# Patient Record
Sex: MALE | Race: WHITE | Employment: FULL TIME | URBAN - NONMETROPOLITAN AREA
[De-identification: names, ages, dates, MRNs, and addresses within clinical notes are randomized per-mention and may not be internally consistent; named-entity substitution may affect disease eponyms.]

---

## 2017-08-24 ENCOUNTER — OFFICE VISIT (OUTPATIENT)
Dept: URGENT CARE | Age: 47
End: 2017-08-24
Payer: COMMERCIAL

## 2017-08-24 VITALS
SYSTOLIC BLOOD PRESSURE: 138 MMHG | WEIGHT: 214 LBS | HEART RATE: 87 BPM | OXYGEN SATURATION: 97 % | BODY MASS INDEX: 32.43 KG/M2 | TEMPERATURE: 98.4 F | HEIGHT: 68 IN | RESPIRATION RATE: 18 BRPM | DIASTOLIC BLOOD PRESSURE: 82 MMHG

## 2017-08-24 DIAGNOSIS — R10.9 CENTRAL ABDOMINAL PAIN: Primary | ICD-10-CM

## 2017-08-24 DIAGNOSIS — R10.9 LEFT FLANK PAIN: ICD-10-CM

## 2017-08-24 DIAGNOSIS — Z87.442 HISTORY OF RENAL CALCULI: ICD-10-CM

## 2017-08-24 DIAGNOSIS — R30.0 DYSURIA: ICD-10-CM

## 2017-08-24 LAB
BILIRUBIN, POC: ABNORMAL
BLOOD URINE, POC: ABNORMAL
CLARITY, POC: CLEAR
COLOR, POC: YELLOW
GLUCOSE URINE, POC: ABNORMAL
KETONES, POC: ABNORMAL
LEUKOCYTE EST, POC: ABNORMAL
NITRITE, POC: ABNORMAL
PH, POC: 6
PROTEIN, POC: ABNORMAL
SPECIFIC GRAVITY, POC: 1.02
UROBILINOGEN, POC: 1

## 2017-08-24 PROCEDURE — 81002 URINALYSIS NONAUTO W/O SCOPE: CPT | Performed by: NURSE PRACTITIONER

## 2017-08-24 PROCEDURE — 99212 OFFICE O/P EST SF 10 MIN: CPT | Performed by: NURSE PRACTITIONER

## 2017-08-24 RX ORDER — OMEPRAZOLE 20 MG/1
20 CAPSULE, DELAYED RELEASE ORAL DAILY
COMMUNITY
Start: 2017-08-17 | End: 2017-09-13 | Stop reason: SDUPTHER

## 2017-08-24 RX ORDER — SIMVASTATIN 40 MG
40 TABLET ORAL NIGHTLY
COMMUNITY
Start: 2017-08-14 | End: 2017-09-13 | Stop reason: SDUPTHER

## 2017-08-24 RX ORDER — RAMIPRIL 10 MG/1
20 CAPSULE ORAL DAILY
COMMUNITY
Start: 2017-08-14 | End: 2017-09-13 | Stop reason: SDUPTHER

## 2017-08-24 RX ORDER — HYDROCHLOROTHIAZIDE 25 MG/1
25 TABLET ORAL DAILY
COMMUNITY
Start: 2017-08-14 | End: 2017-12-14 | Stop reason: SDUPTHER

## 2017-08-24 RX ORDER — BUPROPION HYDROCHLORIDE 150 MG/1
150 TABLET ORAL EVERY MORNING
COMMUNITY
Start: 2017-08-14 | End: 2017-09-13 | Stop reason: SDUPTHER

## 2017-08-24 RX ORDER — IBUPROFEN AND FAMOTIDINE 800; 26.6 MG/1; MG/1
TABLET, COATED ORAL
COMMUNITY
Start: 2017-08-11 | End: 2017-09-12 | Stop reason: ALTCHOICE

## 2017-08-24 ASSESSMENT — ENCOUNTER SYMPTOMS
CONSTIPATION: 0
ABDOMINAL PAIN: 1
VOMITING: 0
NAUSEA: 0

## 2017-08-25 ENCOUNTER — TELEPHONE (OUTPATIENT)
Dept: URGENT CARE | Age: 47
End: 2017-08-25

## 2017-08-25 DIAGNOSIS — K80.20 GALLSTONES: Primary | ICD-10-CM

## 2017-08-30 DIAGNOSIS — R10.9 LEFT FLANK PAIN: ICD-10-CM

## 2017-08-30 DIAGNOSIS — Z87.442 HISTORY OF RENAL CALCULI: ICD-10-CM

## 2017-08-30 DIAGNOSIS — R10.9 CENTRAL ABDOMINAL PAIN: ICD-10-CM

## 2017-09-06 LAB
ALBUMIN SERPL-MCNC: 4.6 G/DL (ref 3.5–5.2)
ALP BLD-CCNC: 50 U/L (ref 40–130)
ALT SERPL-CCNC: 28 U/L (ref 5–41)
ANION GAP SERPL CALCULATED.3IONS-SCNC: 16 MMOL/L (ref 7–19)
AST SERPL-CCNC: 23 U/L (ref 5–40)
BASOPHILS ABSOLUTE: 0 K/UL (ref 0–0.2)
BASOPHILS RELATIVE PERCENT: 0.7 % (ref 0–1)
BILIRUB SERPL-MCNC: 0.6 MG/DL (ref 0.2–1.2)
BUN BLDV-MCNC: 16 MG/DL (ref 6–20)
CALCIUM SERPL-MCNC: 9.4 MG/DL (ref 8.6–10)
CHLORIDE BLD-SCNC: 98 MMOL/L (ref 98–111)
CHOLESTEROL, TOTAL: 202 MG/DL (ref 160–199)
CO2: 25 MMOL/L (ref 22–29)
CREAT SERPL-MCNC: 1.3 MG/DL (ref 0.5–1.2)
EOSINOPHILS ABSOLUTE: 0.2 K/UL (ref 0–0.6)
EOSINOPHILS RELATIVE PERCENT: 2.6 % (ref 0–5)
GFR NON-AFRICAN AMERICAN: 59
GLUCOSE BLD-MCNC: 91 MG/DL (ref 74–109)
HCT VFR BLD CALC: 47.4 % (ref 42–52)
HDLC SERPL-MCNC: 40 MG/DL (ref 55–121)
HEMOGLOBIN: 16.5 G/DL (ref 14–18)
LDL CHOLESTEROL CALCULATED: 122 MG/DL
LYMPHOCYTES ABSOLUTE: 2.4 K/UL (ref 1.1–4.5)
LYMPHOCYTES RELATIVE PERCENT: 41.8 % (ref 20–40)
MCH RBC QN AUTO: 32.7 PG (ref 27–31)
MCHC RBC AUTO-ENTMCNC: 34.8 G/DL (ref 33–37)
MCV RBC AUTO: 93.9 FL (ref 80–94)
MONOCYTES ABSOLUTE: 0.6 K/UL (ref 0–0.9)
MONOCYTES RELATIVE PERCENT: 9.8 % (ref 0–10)
NEUTROPHILS ABSOLUTE: 2.6 K/UL (ref 1.5–7.5)
NEUTROPHILS RELATIVE PERCENT: 44.9 % (ref 50–65)
PDW BLD-RTO: 12.3 % (ref 11.5–14.5)
PLATELET # BLD: 255 K/UL (ref 130–400)
PMV BLD AUTO: 9.2 FL (ref 9.4–12.4)
POTASSIUM SERPL-SCNC: 4.4 MMOL/L (ref 3.5–5)
RBC # BLD: 5.05 M/UL (ref 4.7–6.1)
SODIUM BLD-SCNC: 139 MMOL/L (ref 136–145)
TOTAL PROTEIN: 7.4 G/DL (ref 6.6–8.7)
TRIGL SERPL-MCNC: 199 MG/DL (ref 150–199)
WBC # BLD: 5.7 K/UL (ref 4.8–10.8)

## 2017-09-13 ENCOUNTER — OFFICE VISIT (OUTPATIENT)
Dept: FAMILY MEDICINE CLINIC | Age: 47
End: 2017-09-13
Payer: COMMERCIAL

## 2017-09-13 VITALS
HEIGHT: 68 IN | DIASTOLIC BLOOD PRESSURE: 88 MMHG | WEIGHT: 214 LBS | SYSTOLIC BLOOD PRESSURE: 128 MMHG | RESPIRATION RATE: 18 BRPM | OXYGEN SATURATION: 96 % | BODY MASS INDEX: 32.43 KG/M2 | HEART RATE: 81 BPM | TEMPERATURE: 98.5 F

## 2017-09-13 DIAGNOSIS — F41.8 DEPRESSION WITH ANXIETY: ICD-10-CM

## 2017-09-13 DIAGNOSIS — I10 ESSENTIAL (PRIMARY) HYPERTENSION: ICD-10-CM

## 2017-09-13 DIAGNOSIS — N41.0 ACUTE PROSTATITIS: Primary | ICD-10-CM

## 2017-09-13 DIAGNOSIS — Z12.5 ENCOUNTER FOR PROSTATE CANCER SCREENING: ICD-10-CM

## 2017-09-13 DIAGNOSIS — K21.9 GASTROESOPHAGEAL REFLUX DISEASE WITHOUT ESOPHAGITIS: ICD-10-CM

## 2017-09-13 DIAGNOSIS — E29.1 HYPOGONADISM IN MALE: ICD-10-CM

## 2017-09-13 DIAGNOSIS — E78.01 FAMILIAL HYPERCHOLESTEROLEMIA: ICD-10-CM

## 2017-09-13 PROCEDURE — 99214 OFFICE O/P EST MOD 30 MIN: CPT | Performed by: FAMILY MEDICINE

## 2017-09-13 RX ORDER — SIMVASTATIN 40 MG
40 TABLET ORAL NIGHTLY
Qty: 30 TABLET | Refills: 5 | Status: SHIPPED | OUTPATIENT
Start: 2017-09-13 | End: 2017-12-14 | Stop reason: SDUPTHER

## 2017-09-13 RX ORDER — RAMIPRIL 10 MG/1
20 CAPSULE ORAL DAILY
Qty: 30 CAPSULE | Refills: 3 | Status: SHIPPED | OUTPATIENT
Start: 2017-09-13 | End: 2017-12-14 | Stop reason: SDUPTHER

## 2017-09-13 RX ORDER — BUPROPION HYDROCHLORIDE 150 MG/1
150 TABLET ORAL EVERY MORNING
Qty: 30 TABLET | Refills: 5 | Status: SHIPPED | OUTPATIENT
Start: 2017-09-13 | End: 2017-12-14 | Stop reason: SDUPTHER

## 2017-09-13 RX ORDER — OMEPRAZOLE 20 MG/1
20 CAPSULE, DELAYED RELEASE ORAL DAILY
Qty: 30 CAPSULE | Refills: 5 | Status: SHIPPED | OUTPATIENT
Start: 2017-09-13 | End: 2017-12-14 | Stop reason: SDUPTHER

## 2017-09-13 RX ORDER — CIPROFLOXACIN 500 MG/1
500 TABLET, FILM COATED ORAL 2 TIMES DAILY
Qty: 60 TABLET | Refills: 0 | Status: SHIPPED | OUTPATIENT
Start: 2017-09-13 | End: 2017-10-13

## 2017-09-14 ASSESSMENT — ENCOUNTER SYMPTOMS
NAUSEA: 0
ANAL BLEEDING: 0
DIARRHEA: 0
CONSTIPATION: 0
ABDOMINAL PAIN: 0
SHORTNESS OF BREATH: 0
CHEST TIGHTNESS: 0
COUGH: 0

## 2017-12-05 DIAGNOSIS — E29.1 HYPOGONADISM IN MALE: ICD-10-CM

## 2017-12-05 DIAGNOSIS — I10 ESSENTIAL (PRIMARY) HYPERTENSION: ICD-10-CM

## 2017-12-05 DIAGNOSIS — Z12.5 ENCOUNTER FOR PROSTATE CANCER SCREENING: ICD-10-CM

## 2017-12-05 LAB
ALBUMIN SERPL-MCNC: 4.8 G/DL (ref 3.5–5.2)
ALP BLD-CCNC: 55 U/L (ref 40–130)
ALT SERPL-CCNC: 24 U/L (ref 5–41)
ANION GAP SERPL CALCULATED.3IONS-SCNC: 13 MMOL/L (ref 7–19)
AST SERPL-CCNC: 17 U/L (ref 5–40)
BASOPHILS ABSOLUTE: 0.1 K/UL (ref 0–0.2)
BASOPHILS RELATIVE PERCENT: 0.8 % (ref 0–1)
BILIRUB SERPL-MCNC: 0.6 MG/DL (ref 0.2–1.2)
BUN BLDV-MCNC: 21 MG/DL (ref 6–20)
CALCIUM SERPL-MCNC: 9.4 MG/DL (ref 8.6–10)
CHLORIDE BLD-SCNC: 98 MMOL/L (ref 98–111)
CO2: 28 MMOL/L (ref 22–29)
CREAT SERPL-MCNC: 1.2 MG/DL (ref 0.5–1.2)
EOSINOPHILS ABSOLUTE: 0.2 K/UL (ref 0–0.6)
EOSINOPHILS RELATIVE PERCENT: 2.4 % (ref 0–5)
GFR NON-AFRICAN AMERICAN: >60
GLUCOSE BLD-MCNC: 94 MG/DL (ref 74–109)
HCT VFR BLD CALC: 50.3 % (ref 42–52)
HEMOGLOBIN: 16.9 G/DL (ref 14–18)
LYMPHOCYTES ABSOLUTE: 2.7 K/UL (ref 1.1–4.5)
LYMPHOCYTES RELATIVE PERCENT: 36.2 % (ref 20–40)
MCH RBC QN AUTO: 31.6 PG (ref 27–31)
MCHC RBC AUTO-ENTMCNC: 33.6 G/DL (ref 33–37)
MCV RBC AUTO: 94 FL (ref 80–94)
MONOCYTES ABSOLUTE: 0.7 K/UL (ref 0–0.9)
MONOCYTES RELATIVE PERCENT: 8.9 % (ref 0–10)
NEUTROPHILS ABSOLUTE: 3.9 K/UL (ref 1.5–7.5)
NEUTROPHILS RELATIVE PERCENT: 51 % (ref 50–65)
PDW BLD-RTO: 12.9 % (ref 11.5–14.5)
PLATELET # BLD: 279 K/UL (ref 130–400)
PMV BLD AUTO: 9.2 FL (ref 9.4–12.4)
POTASSIUM SERPL-SCNC: 4.5 MMOL/L (ref 3.5–5)
PROSTATE SPECIFIC ANTIGEN: 0.51 NG/ML (ref 0–4)
RBC # BLD: 5.35 M/UL (ref 4.7–6.1)
SODIUM BLD-SCNC: 139 MMOL/L (ref 136–145)
TESTOSTERONE TOTAL: 99.5 NG/DL (ref 249–836)
TOTAL PROTEIN: 7.2 G/DL (ref 6.6–8.7)
WBC # BLD: 7.6 K/UL (ref 4.8–10.8)

## 2017-12-14 ENCOUNTER — OFFICE VISIT (OUTPATIENT)
Dept: FAMILY MEDICINE CLINIC | Age: 47
End: 2017-12-14
Payer: COMMERCIAL

## 2017-12-14 VITALS
WEIGHT: 217 LBS | TEMPERATURE: 98.6 F | DIASTOLIC BLOOD PRESSURE: 90 MMHG | OXYGEN SATURATION: 98 % | BODY MASS INDEX: 32.14 KG/M2 | RESPIRATION RATE: 20 BRPM | SYSTOLIC BLOOD PRESSURE: 128 MMHG | HEIGHT: 69 IN | HEART RATE: 98 BPM

## 2017-12-14 DIAGNOSIS — I10 ESSENTIAL (PRIMARY) HYPERTENSION: ICD-10-CM

## 2017-12-14 DIAGNOSIS — F41.8 DEPRESSION WITH ANXIETY: ICD-10-CM

## 2017-12-14 DIAGNOSIS — E78.01 FAMILIAL HYPERCHOLESTEROLEMIA: ICD-10-CM

## 2017-12-14 DIAGNOSIS — K21.9 GASTROESOPHAGEAL REFLUX DISEASE WITHOUT ESOPHAGITIS: ICD-10-CM

## 2017-12-14 DIAGNOSIS — E29.1 HYPOGONADISM IN MALE: Primary | ICD-10-CM

## 2017-12-14 PROCEDURE — 99214 OFFICE O/P EST MOD 30 MIN: CPT | Performed by: FAMILY MEDICINE

## 2017-12-14 RX ORDER — OMEPRAZOLE 20 MG/1
20 CAPSULE, DELAYED RELEASE ORAL DAILY
Qty: 30 CAPSULE | Refills: 5 | Status: SHIPPED | OUTPATIENT
Start: 2017-12-14 | End: 2018-06-21 | Stop reason: SDUPTHER

## 2017-12-14 RX ORDER — TESTOSTERONE CYPIONATE 200 MG/ML
300 INJECTION INTRAMUSCULAR
Qty: 10 ML | Refills: 2 | Status: SHIPPED | OUTPATIENT
Start: 2017-12-14 | End: 2018-09-28 | Stop reason: SDUPTHER

## 2017-12-14 RX ORDER — HYDROCHLOROTHIAZIDE 25 MG/1
25 TABLET ORAL DAILY
Qty: 30 TABLET | Refills: 3 | Status: SHIPPED | OUTPATIENT
Start: 2017-12-14 | End: 2018-06-29 | Stop reason: SDUPTHER

## 2017-12-14 RX ORDER — SIMVASTATIN 40 MG
40 TABLET ORAL NIGHTLY
Qty: 30 TABLET | Refills: 5 | Status: SHIPPED | OUTPATIENT
Start: 2017-12-14 | End: 2018-06-21 | Stop reason: SDUPTHER

## 2017-12-14 RX ORDER — RAMIPRIL 10 MG/1
20 CAPSULE ORAL DAILY
Qty: 30 CAPSULE | Refills: 3 | Status: SHIPPED | OUTPATIENT
Start: 2017-12-14 | End: 2018-06-21 | Stop reason: SDUPTHER

## 2017-12-14 RX ORDER — BUPROPION HYDROCHLORIDE 150 MG/1
150 TABLET ORAL EVERY MORNING
Qty: 30 TABLET | Refills: 5 | Status: SHIPPED | OUTPATIENT
Start: 2017-12-14 | End: 2018-06-21 | Stop reason: SDUPTHER

## 2017-12-14 NOTE — PROGRESS NOTES
Physical Exam  Physical Exam   Constitutional: appears well-developed. does not appear ill. Eyes: Pupils are equal, round, and reactive to light. Neck: Normal range of motion. Neck supple. Cardiovascular: Normal rate and regular rhythm. Exam reveals no friction rub. No murmur heard. Pulmonary/Chest: Effort normal and breath sounds normal. No respiratory distress. He has no wheezes. no rales. Abdominal: Soft. Bowel sounds are normal. He exhibits no distension. There is no tenderness. There is no rebound and no guarding. Musculoskeletal: exhibits no edema. Neurological: alert. Psychiatric: normal mood and affect.  behavior is normal.       Recent Results (from the past 672 hour(s))   CBC Auto Differential    Collection Time: 12/05/17  7:14 AM   Result Value Ref Range    WBC 7.6 4.8 - 10.8 K/uL    RBC 5.35 4.70 - 6.10 M/uL    Hemoglobin 16.9 14.0 - 18.0 g/dL    Hematocrit 50.3 42.0 - 52.0 %    MCV 94.0 80.0 - 94.0 fL    MCH 31.6 (H) 27.0 - 31.0 pg    MCHC 33.6 33.0 - 37.0 g/dL    RDW 12.9 11.5 - 14.5 %    Platelets 218 744 - 237 K/uL    MPV 9.2 (L) 9.4 - 12.4 fL    Neutrophils % 51.0 50.0 - 65.0 %    Lymphocytes % 36.2 20.0 - 40.0 %    Monocytes % 8.9 0.0 - 10.0 %    Eosinophils % 2.4 0.0 - 5.0 %    Basophils % 0.8 0.0 - 1.0 %    Neutrophils # 3.9 1.5 - 7.5 K/uL    Lymphocytes # 2.7 1.1 - 4.5 K/uL    Monocytes # 0.70 0.00 - 0.90 K/uL    Eosinophils # 0.20 0.00 - 0.60 K/uL    Basophils # 0.10 0.00 - 0.20 K/uL   Comprehensive Metabolic Panel    Collection Time: 12/05/17  7:14 AM   Result Value Ref Range    Sodium 139 136 - 145 mmol/L    Potassium 4.5 3.5 - 5.0 mmol/L    Chloride 98 98 - 111 mmol/L    CO2 28 22 - 29 mmol/L    Anion Gap 13 7 - 19 mmol/L    Glucose 94 74 - 109 mg/dL    BUN 21 (H) 6 - 20 mg/dL    CREATININE 1.2 0.5 - 1.2 mg/dL    GFR Non-African American >60 >60    Calcium 9.4 8.6 - 10.0 mg/dL    Total Protein 7.2 6.6 - 8.7 g/dL    Alb 4.8 3.5 - 5.2 g/dL    Total Bilirubin 0.6 0.2 - 1.2 mg/dL    Alkaline Phosphatase 55 40 - 130 U/L    ALT 24 5 - 41 U/L    AST 17 5 - 40 U/L   Psa screening    Collection Time: 12/05/17  7:14 AM   Result Value Ref Range    PSA 0.51 0.00 - 4.00 ng/mL   Testosterone    Collection Time: 12/05/17  7:14 AM   Result Value Ref Range    Testosterone 99.5 (L) 249.0 - 836.0 ng/dL               Assessment & Plan: The following diagnoses and conditions are stable with no further orders unless indicated:  1. Depression with anxiety  Symptoms are stable refilled the following medication  - buPROPion (WELLBUTRIN XL) 150 MG extended release tablet; Take 1 tablet by mouth every morning  Dispense: 30 tablet; Refill: 5    2. Gastroesophageal reflux disease without esophagitis  Symptoms controlled  - omeprazole (PRILOSEC) 20 MG delayed release capsule; Take 1 capsule by mouth Daily  Dispense: 30 capsule; Refill: 5    3. Essential (primary) hypertension  Blood pressure at goal  - ramipril (ALTACE) 10 MG capsule; Take 2 capsules by mouth daily  Dispense: 30 capsule; Refill: 3    4. Familial hypercholesterolemia  Continue simvastatin at current dosecheck lipids next visit  - simvastatin (ZOCOR) 40 MG tablet; Take 1 tablet by mouth nightly  Dispense: 30 tablet; Refill: 5    5. Hypogonadism in male  Testosterone remains low. He is not considerably high dose of testosterone. Offered referral to endocrinology for further input. Overall he feels his symptoms are stable. PSA, hemoglobin and liver enzymes are stable. Discussed potential risks of taking testosterone replacement including erythrocytosis, blood clots, possible prostate cancer and worsening of sleep apnea. - Comprehensive Metabolic Panel; Future  - Psa screening; Future  - Lipid Panel; Future  - CBC Auto Differential; Future  - Testosterone; Future            Return in about 6 months (around 6/14/2018) for Routine follow up. Discussed use, benefit, and side effects of prescribed medications.   All patient

## 2018-06-12 DIAGNOSIS — E29.1 HYPOGONADISM IN MALE: ICD-10-CM

## 2018-06-12 LAB
ALBUMIN SERPL-MCNC: 4.5 G/DL (ref 3.5–5.2)
ALP BLD-CCNC: 46 U/L (ref 40–130)
ALT SERPL-CCNC: 18 U/L (ref 5–41)
ANION GAP SERPL CALCULATED.3IONS-SCNC: 15 MMOL/L (ref 7–19)
AST SERPL-CCNC: 19 U/L (ref 5–40)
BASOPHILS ABSOLUTE: 0.1 K/UL (ref 0–0.2)
BASOPHILS RELATIVE PERCENT: 0.7 % (ref 0–1)
BILIRUB SERPL-MCNC: 0.9 MG/DL (ref 0.2–1.2)
BUN BLDV-MCNC: 16 MG/DL (ref 6–20)
CALCIUM SERPL-MCNC: 9.5 MG/DL (ref 8.6–10)
CHLORIDE BLD-SCNC: 99 MMOL/L (ref 98–111)
CHOLESTEROL, TOTAL: 176 MG/DL (ref 160–199)
CO2: 26 MMOL/L (ref 22–29)
CREAT SERPL-MCNC: 1.3 MG/DL (ref 0.5–1.2)
EOSINOPHILS ABSOLUTE: 0.1 K/UL (ref 0–0.6)
EOSINOPHILS RELATIVE PERCENT: 2 % (ref 0–5)
GFR NON-AFRICAN AMERICAN: 59
GLUCOSE BLD-MCNC: 94 MG/DL (ref 74–109)
HCT VFR BLD CALC: 50.1 % (ref 42–52)
HDLC SERPL-MCNC: 36 MG/DL (ref 55–121)
HEMOGLOBIN: 16.4 G/DL (ref 14–18)
LDL CHOLESTEROL CALCULATED: 90 MG/DL
LYMPHOCYTES ABSOLUTE: 2.3 K/UL (ref 1.1–4.5)
LYMPHOCYTES RELATIVE PERCENT: 33.9 % (ref 20–40)
MCH RBC QN AUTO: 30.8 PG (ref 27–31)
MCHC RBC AUTO-ENTMCNC: 32.7 G/DL (ref 33–37)
MCV RBC AUTO: 94 FL (ref 80–94)
MONOCYTES ABSOLUTE: 0.8 K/UL (ref 0–0.9)
MONOCYTES RELATIVE PERCENT: 11.1 % (ref 0–10)
NEUTROPHILS ABSOLUTE: 3.6 K/UL (ref 1.5–7.5)
NEUTROPHILS RELATIVE PERCENT: 52 % (ref 50–65)
PDW BLD-RTO: 13.1 % (ref 11.5–14.5)
PLATELET # BLD: 224 K/UL (ref 130–400)
PMV BLD AUTO: 9.4 FL (ref 9.4–12.4)
POTASSIUM SERPL-SCNC: 4.1 MMOL/L (ref 3.5–5)
PROSTATE SPECIFIC ANTIGEN: 0.68 NG/ML (ref 0–4)
RBC # BLD: 5.33 M/UL (ref 4.7–6.1)
SODIUM BLD-SCNC: 140 MMOL/L (ref 136–145)
TESTOSTERONE TOTAL: 941.8 NG/DL (ref 249–836)
TOTAL PROTEIN: 7 G/DL (ref 6.6–8.7)
TRIGL SERPL-MCNC: 250 MG/DL (ref 0–149)
WBC # BLD: 6.9 K/UL (ref 4.8–10.8)

## 2018-06-21 ENCOUNTER — OFFICE VISIT (OUTPATIENT)
Dept: FAMILY MEDICINE CLINIC | Age: 48
End: 2018-06-21
Payer: COMMERCIAL

## 2018-06-21 VITALS
DIASTOLIC BLOOD PRESSURE: 96 MMHG | OXYGEN SATURATION: 97 % | RESPIRATION RATE: 18 BRPM | TEMPERATURE: 98.8 F | BODY MASS INDEX: 31.01 KG/M2 | WEIGHT: 210 LBS | SYSTOLIC BLOOD PRESSURE: 132 MMHG | HEART RATE: 92 BPM

## 2018-06-21 DIAGNOSIS — F41.8 DEPRESSION WITH ANXIETY: ICD-10-CM

## 2018-06-21 DIAGNOSIS — E29.1 HYPOGONADISM IN MALE: ICD-10-CM

## 2018-06-21 DIAGNOSIS — I10 ESSENTIAL (PRIMARY) HYPERTENSION: ICD-10-CM

## 2018-06-21 DIAGNOSIS — E78.01 FAMILIAL HYPERCHOLESTEROLEMIA: ICD-10-CM

## 2018-06-21 DIAGNOSIS — N50.819 TESTICULAR PAIN: Primary | ICD-10-CM

## 2018-06-21 DIAGNOSIS — K21.9 GASTROESOPHAGEAL REFLUX DISEASE WITHOUT ESOPHAGITIS: ICD-10-CM

## 2018-06-21 LAB
BILIRUBIN, POC: NORMAL
BLOOD URINE, POC: NORMAL
CLARITY, POC: CLEAR
COLOR, POC: YELLOW
GLUCOSE URINE, POC: NORMAL
KETONES, POC: NORMAL
LEUKOCYTE EST, POC: NORMAL
NITRITE, POC: NORMAL
PH, POC: 5.5
PROTEIN, POC: NORMAL
SPECIFIC GRAVITY, POC: 1.03
UROBILINOGEN, POC: 0.2

## 2018-06-21 PROCEDURE — 99214 OFFICE O/P EST MOD 30 MIN: CPT | Performed by: FAMILY MEDICINE

## 2018-06-21 PROCEDURE — 81002 URINALYSIS NONAUTO W/O SCOPE: CPT | Performed by: FAMILY MEDICINE

## 2018-06-21 RX ORDER — CIPROFLOXACIN 500 MG/1
500 TABLET, FILM COATED ORAL 2 TIMES DAILY
Qty: 20 TABLET | Refills: 0 | Status: SHIPPED | OUTPATIENT
Start: 2018-06-21 | End: 2018-06-28

## 2018-06-21 RX ORDER — BUPROPION HYDROCHLORIDE 150 MG/1
150 TABLET ORAL EVERY MORNING
Qty: 30 TABLET | Refills: 5 | Status: SHIPPED | OUTPATIENT
Start: 2018-06-21 | End: 2019-01-03 | Stop reason: SDUPTHER

## 2018-06-21 RX ORDER — RAMIPRIL 10 MG/1
20 CAPSULE ORAL DAILY
Qty: 60 CAPSULE | Refills: 5 | Status: SHIPPED | OUTPATIENT
Start: 2018-06-21 | End: 2018-06-29 | Stop reason: SDUPTHER

## 2018-06-21 RX ORDER — OMEPRAZOLE 20 MG/1
20 CAPSULE, DELAYED RELEASE ORAL DAILY
Qty: 30 CAPSULE | Refills: 5 | Status: SHIPPED | OUTPATIENT
Start: 2018-06-21

## 2018-06-21 RX ORDER — SIMVASTATIN 40 MG
40 TABLET ORAL NIGHTLY
Qty: 30 TABLET | Refills: 5 | Status: SHIPPED | OUTPATIENT
Start: 2018-06-21 | End: 2018-07-14 | Stop reason: SDUPTHER

## 2018-06-21 ASSESSMENT — PATIENT HEALTH QUESTIONNAIRE - PHQ9
SUM OF ALL RESPONSES TO PHQ QUESTIONS 1-9: 0
1. LITTLE INTEREST OR PLEASURE IN DOING THINGS: 0
SUM OF ALL RESPONSES TO PHQ9 QUESTIONS 1 & 2: 0
2. FEELING DOWN, DEPRESSED OR HOPELESS: 0

## 2018-06-22 ENCOUNTER — HOSPITAL ENCOUNTER (OUTPATIENT)
Dept: ULTRASOUND IMAGING | Age: 48
Discharge: HOME | End: 2018-06-22
Payer: COMMERCIAL

## 2018-06-22 DIAGNOSIS — N50.819 TESTICULAR PAIN: ICD-10-CM

## 2018-06-22 PROCEDURE — 76870 US EXAM SCROTUM: CPT

## 2018-06-24 ASSESSMENT — ENCOUNTER SYMPTOMS
ANAL BLEEDING: 0
NAUSEA: 0
SHORTNESS OF BREATH: 0
CHEST TIGHTNESS: 0
CONSTIPATION: 0
COUGH: 0
ABDOMINAL PAIN: 0
DIARRHEA: 0

## 2018-06-29 DIAGNOSIS — I10 ESSENTIAL (PRIMARY) HYPERTENSION: ICD-10-CM

## 2018-06-29 RX ORDER — RAMIPRIL 10 MG/1
CAPSULE ORAL
Qty: 180 CAPSULE | Refills: 0 | Status: SHIPPED | OUTPATIENT
Start: 2018-06-29 | End: 2019-01-03 | Stop reason: SDUPTHER

## 2018-06-29 RX ORDER — HYDROCHLOROTHIAZIDE 25 MG/1
25 TABLET ORAL DAILY
Qty: 30 TABLET | Refills: 3 | Status: SHIPPED | OUTPATIENT
Start: 2018-06-29 | End: 2019-01-03 | Stop reason: SDUPTHER

## 2018-09-28 DIAGNOSIS — E29.1 HYPOGONADISM IN MALE: Primary | ICD-10-CM

## 2018-09-28 RX ORDER — TESTOSTERONE CYPIONATE 200 MG/ML
300 INJECTION INTRAMUSCULAR
Qty: 10 ML | Refills: 0 | Status: SHIPPED | OUTPATIENT
Start: 2018-09-28 | End: 2019-01-03 | Stop reason: SDUPTHER

## 2018-12-27 DIAGNOSIS — E78.01 FAMILIAL HYPERCHOLESTEROLEMIA: ICD-10-CM

## 2018-12-27 DIAGNOSIS — N50.819 TESTICULAR PAIN: ICD-10-CM

## 2018-12-27 DIAGNOSIS — E29.1 HYPOGONADISM IN MALE: ICD-10-CM

## 2018-12-27 LAB
ALBUMIN SERPL-MCNC: 4.6 G/DL (ref 3.5–5.2)
ALP BLD-CCNC: 48 U/L (ref 40–130)
ALT SERPL-CCNC: 26 U/L (ref 5–41)
ANION GAP SERPL CALCULATED.3IONS-SCNC: 17 MMOL/L (ref 7–19)
AST SERPL-CCNC: 19 U/L (ref 5–40)
BASOPHILS ABSOLUTE: 0 K/UL (ref 0–0.2)
BASOPHILS RELATIVE PERCENT: 0.4 % (ref 0–1)
BILIRUB SERPL-MCNC: 0.7 MG/DL (ref 0.2–1.2)
BUN BLDV-MCNC: 12 MG/DL (ref 6–20)
CALCIUM SERPL-MCNC: 9.6 MG/DL (ref 8.6–10)
CHLORIDE BLD-SCNC: 97 MMOL/L (ref 98–111)
CHOLESTEROL, TOTAL: 202 MG/DL (ref 160–199)
CO2: 25 MMOL/L (ref 22–29)
CREAT SERPL-MCNC: 1.2 MG/DL (ref 0.5–1.2)
EOSINOPHILS ABSOLUTE: 0.2 K/UL (ref 0–0.6)
EOSINOPHILS RELATIVE PERCENT: 2.7 % (ref 0–5)
GFR NON-AFRICAN AMERICAN: >60
GLUCOSE BLD-MCNC: 99 MG/DL (ref 74–109)
HCT VFR BLD CALC: 52.9 % (ref 42–52)
HDLC SERPL-MCNC: 36 MG/DL (ref 55–121)
HEMOGLOBIN: 17.9 G/DL (ref 14–18)
LDL CHOLESTEROL CALCULATED: 109 MG/DL
LYMPHOCYTES ABSOLUTE: 2.3 K/UL (ref 1.1–4.5)
LYMPHOCYTES RELATIVE PERCENT: 32 % (ref 20–40)
MCH RBC QN AUTO: 30.8 PG (ref 27–31)
MCHC RBC AUTO-ENTMCNC: 33.8 G/DL (ref 33–37)
MCV RBC AUTO: 90.9 FL (ref 80–94)
MONOCYTES ABSOLUTE: 0.6 K/UL (ref 0–0.9)
MONOCYTES RELATIVE PERCENT: 8.6 % (ref 0–10)
NEUTROPHILS ABSOLUTE: 4 K/UL (ref 1.5–7.5)
NEUTROPHILS RELATIVE PERCENT: 56 % (ref 50–65)
PDW BLD-RTO: 13.2 % (ref 11.5–14.5)
PLATELET # BLD: 271 K/UL (ref 130–400)
PMV BLD AUTO: 9.4 FL (ref 9.4–12.4)
POTASSIUM SERPL-SCNC: 3.8 MMOL/L (ref 3.5–5)
RBC # BLD: 5.82 M/UL (ref 4.7–6.1)
SODIUM BLD-SCNC: 139 MMOL/L (ref 136–145)
TESTOSTERONE TOTAL: >1500 NG/DL (ref 249–836)
TOTAL PROTEIN: 7.5 G/DL (ref 6.6–8.7)
TRIGL SERPL-MCNC: 285 MG/DL (ref 0–149)
WBC # BLD: 7.1 K/UL (ref 4.8–10.8)

## 2019-01-03 ENCOUNTER — OFFICE VISIT (OUTPATIENT)
Dept: FAMILY MEDICINE CLINIC | Age: 49
End: 2019-01-03
Payer: COMMERCIAL

## 2019-01-03 VITALS
TEMPERATURE: 98.2 F | HEIGHT: 68 IN | DIASTOLIC BLOOD PRESSURE: 94 MMHG | WEIGHT: 219 LBS | SYSTOLIC BLOOD PRESSURE: 128 MMHG | BODY MASS INDEX: 33.19 KG/M2 | OXYGEN SATURATION: 98 % | HEART RATE: 97 BPM

## 2019-01-03 DIAGNOSIS — I10 ESSENTIAL (PRIMARY) HYPERTENSION: ICD-10-CM

## 2019-01-03 DIAGNOSIS — E29.1 HYPOGONADISM IN MALE: ICD-10-CM

## 2019-01-03 DIAGNOSIS — L91.8 SKIN TAG, ACQUIRED: Primary | ICD-10-CM

## 2019-01-03 DIAGNOSIS — F41.8 DEPRESSION WITH ANXIETY: ICD-10-CM

## 2019-01-03 DIAGNOSIS — K21.9 GASTROESOPHAGEAL REFLUX DISEASE WITHOUT ESOPHAGITIS: ICD-10-CM

## 2019-01-03 DIAGNOSIS — E78.01 FAMILIAL HYPERCHOLESTEROLEMIA: ICD-10-CM

## 2019-01-03 PROCEDURE — 99214 OFFICE O/P EST MOD 30 MIN: CPT | Performed by: FAMILY MEDICINE

## 2019-01-03 RX ORDER — RAMIPRIL 10 MG/1
CAPSULE ORAL
Qty: 60 CAPSULE | Refills: 5 | Status: SHIPPED | OUTPATIENT
Start: 2019-01-03 | End: 2019-07-06 | Stop reason: SDUPTHER

## 2019-01-03 RX ORDER — SIMVASTATIN 40 MG
TABLET ORAL
Qty: 30 TABLET | Refills: 5 | Status: SHIPPED | OUTPATIENT
Start: 2019-01-03 | End: 2019-07-12 | Stop reason: SDUPTHER

## 2019-01-03 RX ORDER — TESTOSTERONE CYPIONATE 200 MG/ML
300 INJECTION INTRAMUSCULAR
Qty: 10 ML | Refills: 1 | Status: SHIPPED | OUTPATIENT
Start: 2019-01-03 | End: 2019-07-12 | Stop reason: SDUPTHER

## 2019-01-03 RX ORDER — BUPROPION HYDROCHLORIDE 150 MG/1
150 TABLET ORAL EVERY MORNING
Qty: 30 TABLET | Refills: 5 | Status: SHIPPED | OUTPATIENT
Start: 2019-01-03 | End: 2019-07-06 | Stop reason: SDUPTHER

## 2019-01-03 RX ORDER — TESTOSTERONE CYPIONATE 200 MG/ML
300 INJECTION INTRAMUSCULAR
Qty: 10 ML | Refills: 0 | Status: SHIPPED | OUTPATIENT
Start: 2019-01-03 | End: 2019-01-03 | Stop reason: SDUPTHER

## 2019-01-03 RX ORDER — HYDROCHLOROTHIAZIDE 25 MG/1
25 TABLET ORAL DAILY
Qty: 30 TABLET | Refills: 5 | Status: SHIPPED | OUTPATIENT
Start: 2019-01-03 | End: 2020-10-12 | Stop reason: ALTCHOICE

## 2019-03-11 DIAGNOSIS — E29.1 HYPOGONADISM IN MALE: ICD-10-CM

## 2019-03-11 LAB
BASOPHILS ABSOLUTE: 0 K/UL (ref 0–0.2)
BASOPHILS RELATIVE PERCENT: 0.5 % (ref 0–1)
EOSINOPHILS ABSOLUTE: 0.2 K/UL (ref 0–0.6)
EOSINOPHILS RELATIVE PERCENT: 3.7 % (ref 0–5)
HCT VFR BLD CALC: 48.7 % (ref 42–52)
HEMOGLOBIN: 16.5 G/DL (ref 14–18)
LYMPHOCYTES ABSOLUTE: 2.4 K/UL (ref 1.1–4.5)
LYMPHOCYTES RELATIVE PERCENT: 37.5 % (ref 20–40)
MCH RBC QN AUTO: 31.3 PG (ref 27–31)
MCHC RBC AUTO-ENTMCNC: 33.9 G/DL (ref 33–37)
MCV RBC AUTO: 92.2 FL (ref 80–94)
MONOCYTES ABSOLUTE: 0.7 K/UL (ref 0–0.9)
MONOCYTES RELATIVE PERCENT: 10.3 % (ref 0–10)
NEUTROPHILS ABSOLUTE: 3 K/UL (ref 1.5–7.5)
NEUTROPHILS RELATIVE PERCENT: 47.8 % (ref 50–65)
PDW BLD-RTO: 12.6 % (ref 11.5–14.5)
PLATELET # BLD: 249 K/UL (ref 130–400)
PMV BLD AUTO: 9.6 FL (ref 9.4–12.4)
RBC # BLD: 5.28 M/UL (ref 4.7–6.1)
TESTOSTERONE TOTAL: 348.8 NG/DL (ref 249–836)
WBC # BLD: 6.3 K/UL (ref 4.8–10.8)

## 2019-07-05 ENCOUNTER — TELEPHONE (OUTPATIENT)
Dept: FAMILY MEDICINE CLINIC | Age: 49
End: 2019-07-05

## 2019-07-05 NOTE — TELEPHONE ENCOUNTER
Subhash Boston has an upcoming appt on 7/12/2019. Patient called this morning wanting to know if he is to have labs done prior to the appt as there are no orders in the chart. Could you please call the patient back to discuss. Thank you.

## 2019-07-06 DIAGNOSIS — I10 ESSENTIAL (PRIMARY) HYPERTENSION: ICD-10-CM

## 2019-07-06 DIAGNOSIS — F41.8 DEPRESSION WITH ANXIETY: ICD-10-CM

## 2019-07-08 DIAGNOSIS — E78.01 FAMILIAL HYPERCHOLESTEROLEMIA: ICD-10-CM

## 2019-07-08 DIAGNOSIS — E29.1 HYPOGONADISM IN MALE: ICD-10-CM

## 2019-07-08 DIAGNOSIS — I10 ESSENTIAL (PRIMARY) HYPERTENSION: Primary | ICD-10-CM

## 2019-07-08 RX ORDER — RAMIPRIL 10 MG/1
CAPSULE ORAL
Qty: 60 CAPSULE | Refills: 5 | Status: SHIPPED | OUTPATIENT
Start: 2019-07-08 | End: 2019-12-03 | Stop reason: SDUPTHER

## 2019-07-08 RX ORDER — BUPROPION HYDROCHLORIDE 150 MG/1
TABLET ORAL
Qty: 30 TABLET | Refills: 5 | Status: SHIPPED | OUTPATIENT
Start: 2019-07-08 | End: 2019-12-03 | Stop reason: SDUPTHER

## 2019-07-11 DIAGNOSIS — E29.1 HYPOGONADISM IN MALE: ICD-10-CM

## 2019-07-11 LAB
ALBUMIN SERPL-MCNC: 4.5 G/DL (ref 3.5–5.2)
ALP BLD-CCNC: 54 U/L (ref 40–130)
ALT SERPL-CCNC: 17 U/L (ref 5–41)
ANION GAP SERPL CALCULATED.3IONS-SCNC: 15 MMOL/L (ref 7–19)
AST SERPL-CCNC: 15 U/L (ref 5–40)
BASOPHILS ABSOLUTE: 0 K/UL (ref 0–0.2)
BASOPHILS RELATIVE PERCENT: 0.7 % (ref 0–1)
BILIRUB SERPL-MCNC: 0.6 MG/DL (ref 0.2–1.2)
BUN BLDV-MCNC: 13 MG/DL (ref 6–20)
CALCIUM SERPL-MCNC: 9.4 MG/DL (ref 8.6–10)
CHLORIDE BLD-SCNC: 101 MMOL/L (ref 98–111)
CHOLESTEROL, TOTAL: 166 MG/DL (ref 160–199)
CO2: 25 MMOL/L (ref 22–29)
CREAT SERPL-MCNC: 1.1 MG/DL (ref 0.5–1.2)
EOSINOPHILS ABSOLUTE: 0.3 K/UL (ref 0–0.6)
EOSINOPHILS RELATIVE PERCENT: 4.9 % (ref 0–5)
GFR NON-AFRICAN AMERICAN: >60
GLUCOSE BLD-MCNC: 100 MG/DL (ref 74–109)
HCT VFR BLD CALC: 47.9 % (ref 42–52)
HDLC SERPL-MCNC: 40 MG/DL (ref 55–121)
HEMOGLOBIN: 15.9 G/DL (ref 14–18)
LDL CHOLESTEROL CALCULATED: 94 MG/DL
LYMPHOCYTES ABSOLUTE: 2 K/UL (ref 1.1–4.5)
LYMPHOCYTES RELATIVE PERCENT: 33.7 % (ref 20–40)
MCH RBC QN AUTO: 31.4 PG (ref 27–31)
MCHC RBC AUTO-ENTMCNC: 33.2 G/DL (ref 33–37)
MCV RBC AUTO: 94.5 FL (ref 80–94)
MONOCYTES ABSOLUTE: 0.6 K/UL (ref 0–0.9)
MONOCYTES RELATIVE PERCENT: 10.6 % (ref 0–10)
NEUTROPHILS ABSOLUTE: 3 K/UL (ref 1.5–7.5)
NEUTROPHILS RELATIVE PERCENT: 49.9 % (ref 50–65)
PDW BLD-RTO: 12.7 % (ref 11.5–14.5)
PLATELET # BLD: 251 K/UL (ref 130–400)
PMV BLD AUTO: 9.6 FL (ref 9.4–12.4)
POTASSIUM SERPL-SCNC: 4.1 MMOL/L (ref 3.5–5)
PROSTATE SPECIFIC ANTIGEN: 0.95 NG/ML (ref 0–4)
RBC # BLD: 5.07 M/UL (ref 4.7–6.1)
SODIUM BLD-SCNC: 141 MMOL/L (ref 136–145)
TESTOSTERONE TOTAL: 256 NG/DL (ref 249–836)
TOTAL PROTEIN: 7.2 G/DL (ref 6.6–8.7)
TRIGL SERPL-MCNC: 161 MG/DL (ref 0–149)
WBC # BLD: 5.9 K/UL (ref 4.8–10.8)

## 2019-07-12 ENCOUNTER — OFFICE VISIT (OUTPATIENT)
Dept: FAMILY MEDICINE CLINIC | Age: 49
End: 2019-07-12
Payer: COMMERCIAL

## 2019-07-12 VITALS
BODY MASS INDEX: 31.32 KG/M2 | OXYGEN SATURATION: 99 % | WEIGHT: 206 LBS | DIASTOLIC BLOOD PRESSURE: 94 MMHG | SYSTOLIC BLOOD PRESSURE: 140 MMHG | HEART RATE: 106 BPM | TEMPERATURE: 98.7 F

## 2019-07-12 DIAGNOSIS — K21.9 GASTROESOPHAGEAL REFLUX DISEASE WITHOUT ESOPHAGITIS: ICD-10-CM

## 2019-07-12 DIAGNOSIS — I10 ESSENTIAL (PRIMARY) HYPERTENSION: Primary | ICD-10-CM

## 2019-07-12 DIAGNOSIS — E78.00 HYPERCHOLESTEROLEMIA: ICD-10-CM

## 2019-07-12 DIAGNOSIS — E78.01 FAMILIAL HYPERCHOLESTEROLEMIA: ICD-10-CM

## 2019-07-12 DIAGNOSIS — E29.1 HYPOGONADISM IN MALE: ICD-10-CM

## 2019-07-12 DIAGNOSIS — L98.9 SKIN LESION: ICD-10-CM

## 2019-07-12 PROCEDURE — 99214 OFFICE O/P EST MOD 30 MIN: CPT | Performed by: FAMILY MEDICINE

## 2019-07-12 RX ORDER — TESTOSTERONE CYPIONATE 200 MG/ML
300 INJECTION INTRAMUSCULAR
Qty: 10 ML | Refills: 1 | Status: SHIPPED | OUTPATIENT
Start: 2019-07-12 | End: 2019-12-10 | Stop reason: SDUPTHER

## 2019-07-12 RX ORDER — SIMVASTATIN 40 MG
TABLET ORAL
Qty: 30 TABLET | Refills: 5 | Status: SHIPPED | OUTPATIENT
Start: 2019-07-12 | End: 2019-12-10 | Stop reason: SDUPTHER

## 2019-07-12 NOTE — PROGRESS NOTES
Elizabeth [address] [place], [place] [address] [facility]  [address] [place], [place] [id number]  Dept: [phone]  Dept Fax: [phone]  Loc: [phone]    Dheeraj Snow is a 51 y.o. male who presents today for his medical conditions/complaints as noted below. Dheeraj Snow is here for 6 Month Follow-Up and Mole (back of L thigh)        HPI:   CC: Here today to discuss the following:    Since his last visit with me he has been . He has expected stress associated with this. Symptoms of insomnia, excessive worry, and anxiety are present. Overall, improving. He isn't interested in increasing medication today. Hyperlipidemia  Tolerating current cholesterol medication without side effects. No body aches. Attempting to reduce processed sugar and cholesterol from diet. Hypogonadism  Tolerating his current testosterone replacement therapy without adverse effects. He has noticed no symptoms of daytime somnolence consistent with sleep apnea. He has had no urinary symptoms. No mood disturbance. Overall satisfied with his current treatment    Hypertension  Compliant with medications. No adverse effects from medication. No lightheadedness, palpitations, or chest pain. Gastroesophageal Reflux Disease  Symptoms currently under control. Medication adequately controls his symptoms. No hematochezia or melena. Reports skin lesion on left inner thigh.      HPI    Past Medical History:   Diagnosis Date    Hyperlipidemia     Hypertension     Kidney stone       Past Surgical History:   Procedure Laterality Date    LITHOTRIPSY         Family History   Problem Relation Age of Onset    Diabetes Mother     High Blood Pressure Mother     Diabetes Father     Heart Disease Father     High Cholesterol Father        Social History     Tobacco Use    Smoking status: Never Smoker    Smokeless tobacco: Never Used   Substance Use Topics    Alcohol use: No     Current Outpatient

## 2019-07-22 ASSESSMENT — ENCOUNTER SYMPTOMS
CHEST TIGHTNESS: 0
DIARRHEA: 0
SHORTNESS OF BREATH: 0
COUGH: 0
ANAL BLEEDING: 0
ABDOMINAL PAIN: 0
NAUSEA: 0
CONSTIPATION: 0

## 2019-10-03 ENCOUNTER — OFFICE VISIT (OUTPATIENT)
Dept: FAMILY MEDICINE CLINIC | Age: 49
End: 2019-10-03
Payer: COMMERCIAL

## 2019-10-03 VITALS
TEMPERATURE: 97.8 F | SYSTOLIC BLOOD PRESSURE: 120 MMHG | HEIGHT: 69 IN | HEART RATE: 79 BPM | DIASTOLIC BLOOD PRESSURE: 84 MMHG | BODY MASS INDEX: 31.32 KG/M2 | WEIGHT: 211.5 LBS | OXYGEN SATURATION: 98 %

## 2019-10-03 DIAGNOSIS — F39 MOOD DISORDER (HCC): Primary | ICD-10-CM

## 2019-10-03 PROCEDURE — 99214 OFFICE O/P EST MOD 30 MIN: CPT | Performed by: FAMILY MEDICINE

## 2019-10-03 RX ORDER — LAMOTRIGINE 25 MG/1
TABLET ORAL
Qty: 45 TABLET | Refills: 3 | Status: SHIPPED | OUTPATIENT
Start: 2019-10-03 | End: 2020-10-12 | Stop reason: ALTCHOICE

## 2019-10-03 RX ORDER — LAMOTRIGINE 100 MG/1
100 TABLET ORAL DAILY
Qty: 30 TABLET | Refills: 3 | Status: SHIPPED | OUTPATIENT
Start: 2019-10-03 | End: 2019-12-10 | Stop reason: SDUPTHER

## 2019-10-15 ASSESSMENT — ENCOUNTER SYMPTOMS
COUGH: 0
ABDOMINAL PAIN: 0
DIARRHEA: 0
ANAL BLEEDING: 0
CONSTIPATION: 0
NAUSEA: 0
CHEST TIGHTNESS: 0
SHORTNESS OF BREATH: 0

## 2019-12-03 DIAGNOSIS — I10 ESSENTIAL (PRIMARY) HYPERTENSION: ICD-10-CM

## 2019-12-03 DIAGNOSIS — F41.8 DEPRESSION WITH ANXIETY: ICD-10-CM

## 2019-12-03 RX ORDER — RAMIPRIL 10 MG/1
CAPSULE ORAL
Qty: 60 CAPSULE | Refills: 5 | Status: SHIPPED | OUTPATIENT
Start: 2019-12-03 | End: 2020-06-15

## 2019-12-05 DIAGNOSIS — E29.1 HYPOGONADISM IN MALE: ICD-10-CM

## 2019-12-05 LAB
ALBUMIN SERPL-MCNC: 4.6 G/DL (ref 3.5–5.2)
ALP BLD-CCNC: 53 U/L (ref 40–130)
ALT SERPL-CCNC: 30 U/L (ref 5–41)
ANION GAP SERPL CALCULATED.3IONS-SCNC: 15 MMOL/L (ref 7–19)
AST SERPL-CCNC: 18 U/L (ref 5–40)
BASOPHILS ABSOLUTE: 0.1 K/UL (ref 0–0.2)
BASOPHILS RELATIVE PERCENT: 1.1 % (ref 0–1)
BILIRUB SERPL-MCNC: 0.6 MG/DL (ref 0.2–1.2)
BUN BLDV-MCNC: 14 MG/DL (ref 6–20)
CALCIUM SERPL-MCNC: 9.5 MG/DL (ref 8.6–10)
CHLORIDE BLD-SCNC: 98 MMOL/L (ref 98–111)
CHOLESTEROL, TOTAL: 204 MG/DL (ref 160–199)
CO2: 27 MMOL/L (ref 22–29)
CREAT SERPL-MCNC: 1.3 MG/DL (ref 0.5–1.2)
EOSINOPHILS ABSOLUTE: 0.2 K/UL (ref 0–0.6)
EOSINOPHILS RELATIVE PERCENT: 2.6 % (ref 0–5)
GFR NON-AFRICAN AMERICAN: 59
GLUCOSE BLD-MCNC: 102 MG/DL (ref 74–109)
HCT VFR BLD CALC: 51.1 % (ref 42–52)
HDLC SERPL-MCNC: 34 MG/DL (ref 55–121)
HEMOGLOBIN: 17.1 G/DL (ref 14–18)
IMMATURE GRANULOCYTES #: 0 K/UL
LDL CHOLESTEROL CALCULATED: 120 MG/DL
LYMPHOCYTES ABSOLUTE: 2.8 K/UL (ref 1.1–4.5)
LYMPHOCYTES RELATIVE PERCENT: 44.7 % (ref 20–40)
MCH RBC QN AUTO: 31.3 PG (ref 27–31)
MCHC RBC AUTO-ENTMCNC: 33.5 G/DL (ref 33–37)
MCV RBC AUTO: 93.6 FL (ref 80–94)
MONOCYTES ABSOLUTE: 0.6 K/UL (ref 0–0.9)
MONOCYTES RELATIVE PERCENT: 9 % (ref 0–10)
NEUTROPHILS ABSOLUTE: 2.7 K/UL (ref 1.5–7.5)
NEUTROPHILS RELATIVE PERCENT: 42.4 % (ref 50–65)
PDW BLD-RTO: 12.8 % (ref 11.5–14.5)
PLATELET # BLD: 244 K/UL (ref 130–400)
PMV BLD AUTO: 9.3 FL (ref 9.4–12.4)
POTASSIUM SERPL-SCNC: 4.1 MMOL/L (ref 3.5–5)
RBC # BLD: 5.46 M/UL (ref 4.7–6.1)
SODIUM BLD-SCNC: 140 MMOL/L (ref 136–145)
TESTOSTERONE TOTAL: 786.5 NG/DL (ref 249–836)
TOTAL PROTEIN: 7 G/DL (ref 6.6–8.7)
TRIGL SERPL-MCNC: 249 MG/DL (ref 0–149)
WBC # BLD: 6.2 K/UL (ref 4.8–10.8)

## 2019-12-05 RX ORDER — BUPROPION HYDROCHLORIDE 150 MG/1
TABLET ORAL
Qty: 90 TABLET | Refills: 5 | Status: SHIPPED | OUTPATIENT
Start: 2019-12-05 | End: 2020-12-13 | Stop reason: SDUPTHER

## 2019-12-10 ENCOUNTER — OFFICE VISIT (OUTPATIENT)
Dept: FAMILY MEDICINE CLINIC | Age: 49
End: 2019-12-10
Payer: COMMERCIAL

## 2019-12-10 VITALS
HEIGHT: 68 IN | TEMPERATURE: 98.1 F | BODY MASS INDEX: 33.34 KG/M2 | DIASTOLIC BLOOD PRESSURE: 78 MMHG | OXYGEN SATURATION: 98 % | SYSTOLIC BLOOD PRESSURE: 120 MMHG | HEART RATE: 80 BPM | WEIGHT: 220 LBS

## 2019-12-10 DIAGNOSIS — K21.9 GASTROESOPHAGEAL REFLUX DISEASE WITHOUT ESOPHAGITIS: ICD-10-CM

## 2019-12-10 DIAGNOSIS — Z12.5 ENCOUNTER FOR PROSTATE CANCER SCREENING: ICD-10-CM

## 2019-12-10 DIAGNOSIS — I10 ESSENTIAL (PRIMARY) HYPERTENSION: Primary | ICD-10-CM

## 2019-12-10 DIAGNOSIS — E29.1 HYPOGONADISM IN MALE: ICD-10-CM

## 2019-12-10 DIAGNOSIS — E78.01 FAMILIAL HYPERCHOLESTEROLEMIA: ICD-10-CM

## 2019-12-10 DIAGNOSIS — F39 MOOD DISORDER (HCC): ICD-10-CM

## 2019-12-10 PROCEDURE — 99214 OFFICE O/P EST MOD 30 MIN: CPT | Performed by: FAMILY MEDICINE

## 2019-12-10 RX ORDER — TESTOSTERONE CYPIONATE 200 MG/ML
300 INJECTION INTRAMUSCULAR
Qty: 10 ML | Refills: 1 | Status: SHIPPED | OUTPATIENT
Start: 2019-12-10 | End: 2020-03-16

## 2019-12-10 RX ORDER — LAMOTRIGINE 100 MG/1
100 TABLET ORAL DAILY
Qty: 30 TABLET | Refills: 3 | Status: SHIPPED | OUTPATIENT
Start: 2019-12-10 | End: 2020-08-14

## 2019-12-10 RX ORDER — SIMVASTATIN 40 MG
TABLET ORAL
Qty: 30 TABLET | Refills: 5 | Status: SHIPPED | OUTPATIENT
Start: 2019-12-10 | End: 2020-07-13

## 2020-03-16 RX ORDER — TESTOSTERONE CYPIONATE 200 MG/ML
INJECTION INTRAMUSCULAR
Qty: 10 ML | Refills: 1 | Status: SHIPPED | OUTPATIENT
Start: 2020-03-16 | End: 2020-06-11 | Stop reason: SDUPTHER

## 2020-03-16 NOTE — TELEPHONE ENCOUNTER
Aixa Doris called to request a refill on his medication. Last office visit : 12/10/2019   Next office visit : 6/11/2020     Last UDS: No results found for: Janie Lisa, LABBENZ, BUPRENUR, COCAIMETSCRU, GABAPENTIN, MDMA, METAMPU, OPIATESCREENURINE, OXTCOSU, PHENCYCLIDINESCREENURINE, PROPOXYPHENE, THCSCREENUR, TRICYUR    Last [place]: today  Medication Contract: n/a   Last Fill: 12/5/19    Requested Prescriptions     Pending Prescriptions Disp Refills    testosterone cypionate (DEPOTESTOTERONE CYPIONATE) 200 MG/ML injection [Pharmacy Med Name: TESTOSTERONE  MG/ SOLN] 10 mL 1     Sig: INJECT 1.5 ML IM EVERY 14 DAYS         Please approve or refuse this medication.    Kevin Cartagena

## 2020-03-16 NOTE — TELEPHONE ENCOUNTER
Refilled medication and e-scribed to pharmacy. Confirm prescription was due. Make sure LYDIA and urine drug screen is up to date.

## 2020-06-09 DIAGNOSIS — E29.1 HYPOGONADISM IN MALE: ICD-10-CM

## 2020-06-09 DIAGNOSIS — I10 ESSENTIAL (PRIMARY) HYPERTENSION: ICD-10-CM

## 2020-06-09 DIAGNOSIS — Z12.5 ENCOUNTER FOR PROSTATE CANCER SCREENING: ICD-10-CM

## 2020-06-09 DIAGNOSIS — E78.01 FAMILIAL HYPERCHOLESTEROLEMIA: ICD-10-CM

## 2020-06-09 LAB
ALBUMIN SERPL-MCNC: 4.6 G/DL (ref 3.5–5.2)
ALP BLD-CCNC: 56 U/L (ref 40–130)
ALT SERPL-CCNC: 17 U/L (ref 5–41)
ANION GAP SERPL CALCULATED.3IONS-SCNC: 13 MMOL/L (ref 7–19)
AST SERPL-CCNC: 17 U/L (ref 5–40)
BASOPHILS ABSOLUTE: 0.1 K/UL (ref 0–0.2)
BASOPHILS RELATIVE PERCENT: 1.1 % (ref 0–1)
BILIRUB SERPL-MCNC: 0.5 MG/DL (ref 0.2–1.2)
BUN BLDV-MCNC: 13 MG/DL (ref 6–20)
CALCIUM SERPL-MCNC: 8.9 MG/DL (ref 8.6–10)
CHLORIDE BLD-SCNC: 101 MMOL/L (ref 98–111)
CHOLESTEROL, TOTAL: 175 MG/DL (ref 160–199)
CO2: 27 MMOL/L (ref 22–29)
CREAT SERPL-MCNC: 1.1 MG/DL (ref 0.5–1.2)
EOSINOPHILS ABSOLUTE: 0.2 K/UL (ref 0–0.6)
EOSINOPHILS RELATIVE PERCENT: 4.2 % (ref 0–5)
GFR NON-AFRICAN AMERICAN: >60
GLUCOSE BLD-MCNC: 97 MG/DL (ref 74–109)
HCT VFR BLD CALC: 47.1 % (ref 42–52)
HDLC SERPL-MCNC: 35 MG/DL (ref 55–121)
HEMOGLOBIN: 15.4 G/DL (ref 14–18)
IMMATURE GRANULOCYTES #: 0 K/UL
LDL CHOLESTEROL CALCULATED: 86 MG/DL
LYMPHOCYTES ABSOLUTE: 1.7 K/UL (ref 1.1–4.5)
LYMPHOCYTES RELATIVE PERCENT: 31.5 % (ref 20–40)
MCH RBC QN AUTO: 31.7 PG (ref 27–31)
MCHC RBC AUTO-ENTMCNC: 32.7 G/DL (ref 33–37)
MCV RBC AUTO: 96.9 FL (ref 80–94)
MONOCYTES ABSOLUTE: 0.6 K/UL (ref 0–0.9)
MONOCYTES RELATIVE PERCENT: 10 % (ref 0–10)
NEUTROPHILS ABSOLUTE: 2.9 K/UL (ref 1.5–7.5)
NEUTROPHILS RELATIVE PERCENT: 53 % (ref 50–65)
PDW BLD-RTO: 13.1 % (ref 11.5–14.5)
PLATELET # BLD: 252 K/UL (ref 130–400)
PMV BLD AUTO: 9.6 FL (ref 9.4–12.4)
POTASSIUM SERPL-SCNC: 4 MMOL/L (ref 3.5–5)
PROSTATE SPECIFIC ANTIGEN: 0.96 NG/ML (ref 0–4)
RBC # BLD: 4.86 M/UL (ref 4.7–6.1)
SODIUM BLD-SCNC: 141 MMOL/L (ref 136–145)
TESTOSTERONE TOTAL: 892.7 NG/DL (ref 249–836)
TOTAL PROTEIN: 6.7 G/DL (ref 6.6–8.7)
TRIGL SERPL-MCNC: 272 MG/DL (ref 0–149)
WBC # BLD: 5.5 K/UL (ref 4.8–10.8)

## 2020-06-11 ENCOUNTER — TELEMEDICINE (OUTPATIENT)
Dept: FAMILY MEDICINE CLINIC | Age: 50
End: 2020-06-11
Payer: COMMERCIAL

## 2020-06-11 PROCEDURE — 99214 OFFICE O/P EST MOD 30 MIN: CPT | Performed by: FAMILY MEDICINE

## 2020-06-11 RX ORDER — TESTOSTERONE CYPIONATE 200 MG/ML
INJECTION INTRAMUSCULAR
Qty: 10 ML | Refills: 1 | Status: SHIPPED | OUTPATIENT
Start: 2020-06-11 | End: 2020-10-12 | Stop reason: SDUPTHER

## 2020-06-15 RX ORDER — RAMIPRIL 10 MG/1
CAPSULE ORAL
Qty: 90 CAPSULE | Refills: 3 | Status: SHIPPED | OUTPATIENT
Start: 2020-06-15 | End: 2020-12-13 | Stop reason: SDUPTHER

## 2020-07-13 RX ORDER — SIMVASTATIN 40 MG
TABLET ORAL
Qty: 30 TABLET | Refills: 5 | Status: SHIPPED | OUTPATIENT
Start: 2020-07-13 | End: 2020-12-13 | Stop reason: SDUPTHER

## 2020-07-13 NOTE — TELEPHONE ENCOUNTER
Derian Arce called to request a refill on his medication.       Last office visit : 12/10/2019   Next office visit : 10/12/2020     Requested Prescriptions     Signed Prescriptions Disp Refills    simvastatin (ZOCOR) 40 MG tablet 30 tablet 5     Sig: TAKE 1 TABLET BY MOUTH EVERY DAY     Authorizing Provider: Parmjit López     Ordering User: Rachel Nelson

## 2020-08-14 RX ORDER — LAMOTRIGINE 100 MG/1
TABLET ORAL
Qty: 30 TABLET | Refills: 3 | Status: SHIPPED | OUTPATIENT
Start: 2020-08-14 | End: 2020-12-13 | Stop reason: SDUPTHER

## 2020-08-14 NOTE — TELEPHONE ENCOUNTER
Orland Sicard called requesting a refill of the below medication which has been pended for you:     Requested Prescriptions     Pending Prescriptions Disp Refills    lamoTRIgine (LAMICTAL) 100 MG tablet [Pharmacy Med Name: LAMOTRIGINE 100 MG TABLET] 30 tablet 3     Sig: TAKE 1 TABLET BY MOUTH EVERY DAY       Last Appointment Date: 6/11/2020  Next Appointment Date: 10/12/2020    Allergies   Allergen Reactions    Azithromycin     Flu Virus Vaccine     Penicillins

## 2020-10-12 ENCOUNTER — OFFICE VISIT (OUTPATIENT)
Dept: FAMILY MEDICINE CLINIC | Age: 50
End: 2020-10-12
Payer: COMMERCIAL

## 2020-10-12 VITALS
OXYGEN SATURATION: 99 % | BODY MASS INDEX: 32.23 KG/M2 | SYSTOLIC BLOOD PRESSURE: 132 MMHG | HEART RATE: 82 BPM | DIASTOLIC BLOOD PRESSURE: 82 MMHG | WEIGHT: 212 LBS | TEMPERATURE: 98 F

## 2020-10-12 PROCEDURE — 99214 OFFICE O/P EST MOD 30 MIN: CPT | Performed by: FAMILY MEDICINE

## 2020-10-12 RX ORDER — TESTOSTERONE CYPIONATE 200 MG/ML
INJECTION INTRAMUSCULAR
Qty: 10 ML | Refills: 1 | Status: SHIPPED | OUTPATIENT
Start: 2020-10-12 | End: 2021-02-22 | Stop reason: SDUPTHER

## 2020-10-12 NOTE — PATIENT INSTRUCTIONS
We are committed to providing you with the best care possible. In order to help us achieve these goals please remember to bring all medications, herbal products, and over the counter supplements with you to each visit. If your provider has ordered testing for you, please be sure to follow up with our office if you have not received results within 7 days after the testing took place. *If you receive a survey after visiting one of our offices, please take time to share your experience concerning your physician office visit. These surveys are confidential and no health information about you is shared. We are eager to improve for you and we are counting on your feedback to help make that happen.  _______________________________________________________________    Ramos Drop Exercises for Vertigo: Care Instructions  Your Care Instructions  Simple exercises can help you regain your balance when you have vertigo. If you have Ménière's disease, benign paroxysmal positional vertigo (BPPV), or another inner ear problem, you may have vertigo off and on. Do these exercises first thing in the morning and before you go to bed. You might get dizzy when you first start them. If this happens, try to do them for at least 5 minutes or about 10 times each exercise. Do a group of exercises at a time, starting at the top of the list. It may take several weeks before you can do all the exercises without feeling dizzy. Follow-up care is a key part of your treatment and safety. Be sure to make and go to all appointments, and call your doctor if you are having problems. It's also a good idea to know your test results and keep a list of the medicines you take. How can you care for yourself at home? Exercise 1  While sitting on the side of the bed and holding your head still:  · Look up as far as you can. · Look down as far as you can. · Look from side to side as far as you can. · Stretch your arm straight out in front of you. Focus on your index finger. Continue to focus on your finger while you bring it to your nose. Exercise 2  While sitting on the side of the bed:  · Bring your head as far back as you can. · Bring your head forward to touch your chin to your chest.  · Turn your head from side to side. · Do these exercises first with your eyes open. Then try with your eyes closed. Exercise 3  While sitting on the side of the bed:  · Shrug your shoulders straight upward, then relax them. · Bend over and try to touch the ground with your fingers. Then go back to a sitting position. · Toss a small ball from one hand to the other. Throw the ball higher than your eyes so you have to look up. Exercise 4  While standing (with someone close by if you feel uncomfortable):  · Repeat Exercise 1.  · Repeat Exercise 2.  · Pass a ball between your legs and above your head. · Sit down and then stand up. Repeat. Turn around in a [place] a different way each time you stand. · With someone close by to help you, try the above exercises with your eyes closed. Exercise 5  In a room that is cleared of obstacles:  · Walk to a corner of the room, turn to your right, and walk back to the starting point. Now, repeat and turn left. · Walk up and down a slope. Now try stairs. · While holding on to someone's arm, try these exercises with your eyes closed. When should you call for help? Watch closely for changes in your health, and be sure to contact your doctor if:    · You do not get better as expected. Where can you learn more? Go to https://CoupOptionmilad.PanelClaw. org and sign in to your PlaySpan account. Enter D438 in the Progressus box to learn more about \"Cawthorne Exercises for Vertigo: Care Instructions. \"     If you do not have an account, please click on the \"Sign Up Now\" link. Current as of: May 12, 2017  Content Version: 11.6  © 5459-2799 [employer].  Care instructions adapted under license by [facility]

## 2020-10-12 NOTE — PROGRESS NOTES
[facility] PHYSICIAN SERVICES  [facility] FAMILY MEDICINE  [address] [facility] [address] [address]  Dept: [phone]  Dept Fax: [phone]  Loc: [phone]    Keri Gusman is a 53 y.o. male who presents today for his medical conditions/complaints as noted below. Keri Gusman is here for Follow-up and Dizziness (possibly ear related)        HPI:   CC: Here today to discuss the following:    Is been having some issues with vertigo for the past few months. Describes the symptom of \"dizziness\". No lightheadedness or syncope. No chest pain or palpitations. No fever chills. He has had no hearing loss or tinnitus. No obvious triggers. States the symptoms only developed for a few seconds and then resolve spontaneously. He has been having some occasional postnasal drainage and sinus pressure but no drainage. Hypogonadism  Tolerating his current testosterone replacement therapy without adverse effects. He has noticed no symptoms of daytime somnolence consistent with sleep apnea. He has had no urinary symptoms. No mood disturbance. Overall satisfied with his current treatment    Hypertension  Compliant with medications. No adverse effects from medication. No lightheadedness, palpitations, or chest pain. Hyperlipidemia  Tolerating current cholesterol medication without side effects. No body aches. Attempting to reduce processed sugar and cholesterol from diet. Depression with Anxiety  Compliant with medications. No adverse effects from medication. Depression and anxiety symptoms are stable today. No suicidal or homicidal ideation. Excessive worry, insomnia, and anxiousness are stable. Energy, concentration, and apathy are stable.               HPI    Past Medical History:   Diagnosis Date    Hyperlipidemia     Hypertension     Kidney stone       Past Surgical History:   Procedure Laterality Date    LITHOTRIPSY         Family History   Problem Relation Age of Onset    Diabetes Mother     High Blood Pressure Mother     Diabetes Father     Heart Disease Father     High Cholesterol Father        Social History     Tobacco Use    Smoking status: Never Smoker    Smokeless tobacco: Never Used   Substance Use Topics    Alcohol use: No     Current Outpatient Medications   Medication Sig Dispense Refill    SYRINGE-NEEDLE, DISP, 3 ML (SAFETY SYRINGE/NEEDLE) 22G X 1-1/2\" 3 ML MISC Use one needle to administer testosterone every 2 weeks 12 each 3    SYRINGE-NEEDLE, DISP, 3 ML 18G X 1-1/2\" 3 ML MISC Use one syringe to draw testosterone every 2 weeks 12 each 3    testosterone cypionate (DEPOTESTOTERONE CYPIONATE) 200 MG/ML injection INJECT 1.5 ML IM EVERY 14 DAYS 10 mL 1    lamoTRIgine (LAMICTAL) 100 MG tablet TAKE 1 TABLET BY MOUTH EVERY DAY 30 tablet 3    simvastatin (ZOCOR) 40 MG tablet TAKE 1 TABLET BY MOUTH EVERY DAY 30 tablet 5    ramipril (ALTACE) 10 MG capsule TAKE 2 CAPSULES BY MOUTH DAILY *7-3-17* 90 capsule 3    buPROPion (WELLBUTRIN XL) 150 MG extended release tablet TAKE 1 TABLET BY MOUTH EVERY DAY IN THE MORNING 90 tablet 5    omeprazole (PRILOSEC) 20 MG delayed release capsule Take 1 capsule by mouth Daily 30 capsule 5     No current facility-administered medications for this visit. Allergies   Allergen Reactions    Azithromycin     Flu Virus Vaccine     Penicillins        Health Maintenance   Topic Date Due    HIV screen  03[birth year]26/1985    Lipid screen  06/09/2021    Potassium monitoring  06/09/2021    Creatinine monitoring  06/09/2021    DTaP/Tdap/Td vaccine (2 - Td) 12/20/2023    Hepatitis A vaccine  Aged Out    Hepatitis B vaccine  Aged Out    Hib vaccine  Aged Out    Meningococcal (ACWY) vaccine  Aged Out    Pneumococcal 0-64 years Vaccine  Aged Out       Subjective:      Review of Systems   Constitutional: Negative for chills and fever. HENT: Negative for congestion. Respiratory: Negative for cough, chest tightness and shortness of breath. Cardiovascular: Negative for chest pain, palpitations and leg swelling. Gastrointestinal: Negative for abdominal pain, anal bleeding, constipation, diarrhea and nausea. Genitourinary: Negative for difficulty urinating. Neurological: Positive for dizziness. Negative for seizures, syncope, facial asymmetry, speech difficulty, light-headedness, numbness and headaches. Psychiatric/Behavioral: Negative. SeeHPI for visit specific review of symptoms. All others negative      Objective:   /82   Pulse 82   Temp 98 °F (36.7 °C)   Wt 212 lb (96.2 kg)   SpO2 99%   BMI 32.23 kg/m²   Physical Exam  Physical Exam   Constitutional: He appears well-developed. Does not appear ill. Eyes: Pupils are equal, round, and reactive to light. Conjunctiva and Lids normal.  Neck: Normal range of motion. Neck supple. No masses. Neck Symmetric. HEENT: TMs clear oropharynx clear  Normal tracheal position. No thyroid enlargement  Cardiovascular: Normal rate and regular rhythm. Exam reveals no friction rub. Carotid arteries: no bruit observed. No murmur heard. Respiratory:  Effort normal and breath sounds normal. No respiratory distress. No wheezes. No rales. No use of accessory muscles or intercostal retractions. Abdominal: Soft. Bowel sounds are normal. exhibits no distension. There is no tenderness. There is no rebound and no guarding. Musculoskeletal: exhibits no edema. Normal gait. Neurological: alert. Psychiatric: normal mood and affect. His behavior is normal. Normal judgement and insight observed. No results found for this or any previous visit (from the past 672 hour(s)). Assessment & Plan: The following diagnoses and conditions are stable with no further orders unless indicated:  1.  Hypogonadism in male  Lab Results   Component Value Date    TESTOSTERONE 892.7 (H) 06/09/2020     Lab Results   Component Value Date    WBC 5.5 06/09/2020    HGB 15.4 06/09/2020    HCT 47.1 06/09/2020    MCV 96.9 (H) 06/09/2020     06/09/2020     Lab Results   Component Value Date    PSA 0.96 06/09/2020    PSA 0.95 07/11/2019    PSA 0.68 06/12/2018     's testosterone level was slightly elevated. Hemoglobin hematocrit stable  PSA normal    - SYRINGE-NEEDLE, DISP, 3 ML (SAFETY SYRINGE/NEEDLE) 22G X 1-1/2\" 3 ML MISC; Use one needle to administer testosterone every 2 weeks  Dispense: 12 each; Refill: 3  - SYRINGE-NEEDLE, DISP, 3 ML 18G X 1-1/2\" 3 ML MISC; Use one syringe to draw testosterone every 2 weeks  Dispense: 12 each; Refill: 3  - testosterone cypionate (DEPOTESTOTERONE CYPIONATE) 200 MG/ML injection; INJECT 1.5 ML IM EVERY 14 DAYS  Dispense: 10 mL; Refill: 1  - Testosterone; Future  Suggested he reduce his testosterone injection from 300 mg every 2 weeks to 280 mg every 2 weeks. 2. Familial hypercholesterolemia  Lab Results   Component Value Date    CHOL 175 06/09/2020    CHOL 204 (H) 12/05/2019    CHOL 166 07/11/2019     Lab Results   Component Value Date    TRIG 272 (H) 06/09/2020    TRIG 249 (H) 12/05/2019    TRIG 161 (H) 07/11/2019     Lab Results   Component Value Date    HDL 35 (L) 06/09/2020    HDL 34 (L) 12/05/2019    HDL 40 (L) 07/11/2019     Lab Results   Component Value Date    LDLCALC 86 06/09/2020    LDLCALC 120 12/05/2019    LDLCALC 94 07/11/2019     No results found for: LABVLDL, VLDL  No results found for: CHOLHDLRATIO  Cholesterol is improved  Discussed lifestyle changes such as diet and exercise. Recommended eliminate processed food from diet such as sugar and fried foods. Recommended exercising at least 150 minutes/week. Try to do full body resistance training twice a week as well. Offered suggestions for calorie counting such as phone apps and online resources such as My fitness pal and Lose it. Discussed healthy weight. - Comprehensive Metabolic Panel; Future  - Lipid Panel; Future    3.  Essential (primary) hypertension  BP Readings from Last 3 Encounters: 10/12/20 132/82   12/10/19 120/78   10/03/19 120/84     Stable  - CBC Auto Differential; Future    4. Depression with anxiety  Stable    5. Vertigo  Given Cawthorne exercises  Discussed over-the-counter meclizine  Suggested over-the-counter Flonase      Return in about 4 months (around 2/12/2021) for Routine follow up - 15 minute visit. Discussed use, benefit, and side effects of prescribed medications. All patient questions answered. Pt voiced understanding. Reviewed health maintenance. Instructedto continue current medications, diet and exercise. Patient agreed with treatmentplan. Follow up as directed.        Note dictated using [id number] [place] [address]

## 2020-10-13 ASSESSMENT — ENCOUNTER SYMPTOMS
COUGH: 0
CONSTIPATION: 0
SHORTNESS OF BREATH: 0
ANAL BLEEDING: 0
CHEST TIGHTNESS: 0
DIARRHEA: 0
NAUSEA: 0
ABDOMINAL PAIN: 0

## 2020-12-14 RX ORDER — LAMOTRIGINE 100 MG/1
100 TABLET ORAL DAILY
Qty: 30 TABLET | Refills: 3 | Status: SHIPPED | OUTPATIENT
Start: 2020-12-14 | End: 2021-04-22

## 2020-12-14 RX ORDER — RAMIPRIL 10 MG/1
CAPSULE ORAL
Qty: 90 CAPSULE | Refills: 3 | Status: SHIPPED | OUTPATIENT
Start: 2020-12-14 | End: 2021-05-27

## 2020-12-14 RX ORDER — SIMVASTATIN 40 MG
TABLET ORAL
Qty: 30 TABLET | Refills: 5 | Status: SHIPPED | OUTPATIENT
Start: 2020-12-14 | End: 2021-06-10 | Stop reason: SDUPTHER

## 2020-12-14 RX ORDER — BUPROPION HYDROCHLORIDE 150 MG/1
TABLET ORAL
Qty: 90 TABLET | Refills: 3 | Status: SHIPPED | OUTPATIENT
Start: 2020-12-14 | End: 2021-11-29 | Stop reason: SDUPTHER

## 2020-12-14 RX ORDER — HYDROCHLOROTHIAZIDE 25 MG/1
25 TABLET ORAL EVERY MORNING
Qty: 30 TABLET | Refills: 5 | Status: SHIPPED | OUTPATIENT
Start: 2020-12-14 | End: 2021-06-10 | Stop reason: SDUPTHER

## 2021-02-18 DIAGNOSIS — I10 ESSENTIAL (PRIMARY) HYPERTENSION: ICD-10-CM

## 2021-02-18 DIAGNOSIS — E78.01 FAMILIAL HYPERCHOLESTEROLEMIA: ICD-10-CM

## 2021-02-18 DIAGNOSIS — E29.1 HYPOGONADISM IN MALE: ICD-10-CM

## 2021-02-18 LAB
ALBUMIN SERPL-MCNC: 4.6 G/DL (ref 3.5–5.2)
ALP BLD-CCNC: 57 U/L (ref 40–130)
ALT SERPL-CCNC: 34 U/L (ref 5–41)
ANION GAP SERPL CALCULATED.3IONS-SCNC: 11 MMOL/L (ref 7–19)
AST SERPL-CCNC: 17 U/L (ref 5–40)
BASOPHILS ABSOLUTE: 0.1 K/UL (ref 0–0.2)
BASOPHILS RELATIVE PERCENT: 0.7 % (ref 0–1)
BILIRUB SERPL-MCNC: 0.6 MG/DL (ref 0.2–1.2)
BUN BLDV-MCNC: 18 MG/DL (ref 6–20)
CALCIUM SERPL-MCNC: 9.4 MG/DL (ref 8.6–10)
CHLORIDE BLD-SCNC: 97 MMOL/L (ref 98–111)
CHOLESTEROL, TOTAL: 214 MG/DL (ref 160–199)
CO2: 28 MMOL/L (ref 22–29)
CREAT SERPL-MCNC: 1.1 MG/DL (ref 0.5–1.2)
EOSINOPHILS ABSOLUTE: 0.2 K/UL (ref 0–0.6)
EOSINOPHILS RELATIVE PERCENT: 2.8 % (ref 0–5)
GFR AFRICAN AMERICAN: >59
GFR NON-AFRICAN AMERICAN: >60
GLUCOSE BLD-MCNC: 98 MG/DL (ref 74–109)
HCT VFR BLD CALC: 49.2 % (ref 42–52)
HDLC SERPL-MCNC: 42 MG/DL (ref 55–121)
HEMOGLOBIN: 16.8 G/DL (ref 14–18)
IMMATURE GRANULOCYTES #: 0 K/UL
LDL CHOLESTEROL CALCULATED: 119 MG/DL
LYMPHOCYTES ABSOLUTE: 2.6 K/UL (ref 1.1–4.5)
LYMPHOCYTES RELATIVE PERCENT: 37.5 % (ref 20–40)
MCH RBC QN AUTO: 31.5 PG (ref 27–31)
MCHC RBC AUTO-ENTMCNC: 34.1 G/DL (ref 33–37)
MCV RBC AUTO: 92.3 FL (ref 80–94)
MONOCYTES ABSOLUTE: 0.7 K/UL (ref 0–0.9)
MONOCYTES RELATIVE PERCENT: 10.1 % (ref 0–10)
NEUTROPHILS ABSOLUTE: 3.4 K/UL (ref 1.5–7.5)
NEUTROPHILS RELATIVE PERCENT: 48.5 % (ref 50–65)
PDW BLD-RTO: 12.7 % (ref 11.5–14.5)
PLATELET # BLD: 277 K/UL (ref 130–400)
PMV BLD AUTO: 9.3 FL (ref 9.4–12.4)
POTASSIUM SERPL-SCNC: 4.2 MMOL/L (ref 3.5–5)
RBC # BLD: 5.33 M/UL (ref 4.7–6.1)
SODIUM BLD-SCNC: 136 MMOL/L (ref 136–145)
TESTOSTERONE TOTAL: 1150 NG/DL (ref 249–836)
TOTAL PROTEIN: 7 G/DL (ref 6.6–8.7)
TRIGL SERPL-MCNC: 265 MG/DL (ref 0–149)
WBC # BLD: 7 K/UL (ref 4.8–10.8)

## 2021-02-22 ENCOUNTER — OFFICE VISIT (OUTPATIENT)
Dept: FAMILY MEDICINE CLINIC | Age: 51
End: 2021-02-22
Payer: COMMERCIAL

## 2021-02-22 VITALS
WEIGHT: 219 LBS | HEIGHT: 68 IN | BODY MASS INDEX: 33.19 KG/M2 | TEMPERATURE: 98.4 F | SYSTOLIC BLOOD PRESSURE: 122 MMHG | HEART RATE: 108 BPM | OXYGEN SATURATION: 98 % | DIASTOLIC BLOOD PRESSURE: 82 MMHG

## 2021-02-22 DIAGNOSIS — F41.8 DEPRESSION WITH ANXIETY: ICD-10-CM

## 2021-02-22 DIAGNOSIS — G47.33 OSA ON CPAP: Primary | ICD-10-CM

## 2021-02-22 DIAGNOSIS — E78.00 HYPERCHOLESTEROLEMIA: ICD-10-CM

## 2021-02-22 DIAGNOSIS — E29.1 HYPOGONADISM IN MALE: ICD-10-CM

## 2021-02-22 DIAGNOSIS — Z99.89 OSA ON CPAP: Primary | ICD-10-CM

## 2021-02-22 PROCEDURE — 99214 OFFICE O/P EST MOD 30 MIN: CPT | Performed by: FAMILY MEDICINE

## 2021-02-22 RX ORDER — TESTOSTERONE CYPIONATE 200 MG/ML
INJECTION INTRAMUSCULAR
Qty: 10 ML | Refills: 1 | Status: SHIPPED | OUTPATIENT
Start: 2021-02-22 | End: 2021-12-13 | Stop reason: SDUPTHER

## 2021-02-22 ASSESSMENT — PATIENT HEALTH QUESTIONNAIRE - PHQ9
SUM OF ALL RESPONSES TO PHQ9 QUESTIONS 1 & 2: 0
SUM OF ALL RESPONSES TO PHQ QUESTIONS 1-9: 0
2. FEELING DOWN, DEPRESSED OR HOPELESS: 0

## 2021-02-22 NOTE — PROGRESS NOTES
[facility] PHYSICIAN SERVICES  [facility] FAMILY MEDICINE  [address] [address]  Dept: [phone]  Dept Fax: [phone]  Loc: [phone]    Kris Moss is a 53 y.o. male who presents today for his medical conditions/complaints as noted below. Kris Moss is here for Follow-up        HPI:   CC: Here today to discuss the following:    Hypogonadism  Tolerating his current testosterone replacement therapy without adverse effects. He has noticed no symptoms of daytime somnolence consistent with sleep apnea. He has had no urinary symptoms. No mood disturbance. Overall satisfied with his current treatment    DENZEL on CPAP  Compliant with CPAP. No daytime somnolence. Feels rested for the most part when wearing CPAP. Hyperlipidemia  Tolerating current cholesterol medication without side effects. No body aches. Attempting to reduce processed sugar and cholesterol from diet. Depression with Anxiety  Compliant with medications. No adverse effects from medication. Depression and anxiety symptoms are stable today. No suicidal or homicidal ideation. Excessive worry, insomnia, and anxiousness are stable. Energy, concentration, and apathy are stable.       HPI    Past Medical History:   Diagnosis Date    Hyperlipidemia     Hypertension     Kidney stone       Past Surgical History:   Procedure Laterality Date    LITHOTRIPSY         Family History   Problem Relation Age of Onset    Diabetes Mother     High Blood Pressure Mother     Diabetes Father     Heart Disease Father     High Cholesterol Father        Social History     Tobacco Use    Smoking status: Never Smoker    Smokeless tobacco: Never Used   Substance Use Topics    Alcohol use: No     Current Outpatient Medications   Medication Sig Dispense Refill    testosterone cypionate (DEPOTESTOTERONE CYPIONATE) 200 MG/ML injection INJECT 1.5 ML IM EVERY 14 DAYS 10 mL 1  buPROPion (WELLBUTRIN XL) 150 MG extended release tablet TAKE 1 TABLET BY MOUTH EVERY DAY IN THE MORNING 90 tablet 3    ramipril (ALTACE) 10 MG capsule TAKE 2 CAPSULES BY MOUTH DAILY *7-3-17* 90 capsule 3    simvastatin (ZOCOR) 40 MG tablet TAKE 1 TABLET BY MOUTH EVERY DAY 30 tablet 5    lamoTRIgine (LAMICTAL) 100 MG tablet Take 1 tablet by mouth daily 30 tablet 3    hydroCHLOROthiazide (HYDRODIURIL) 25 MG tablet Take 1 tablet by mouth every morning 30 tablet 5    SYRINGE-NEEDLE, DISP, 3 ML (SAFETY SYRINGE/NEEDLE) 22G X 1-1/2\" 3 ML MISC Use one needle to administer testosterone every 2 weeks 12 each 3    SYRINGE-NEEDLE, DISP, 3 ML 18G X 1-1/2\" 3 ML MISC Use one syringe to draw testosterone every 2 weeks 12 each 3    omeprazole (PRILOSEC) 20 MG delayed release capsule Take 1 capsule by mouth Daily 30 capsule 5     No current facility-administered medications for this visit. Allergies   Allergen Reactions    Azithromycin     Flu Virus Vaccine     Penicillins        Health Maintenance   Topic Date Due    Hepatitis C screen  1970    HIV screen  03/26/1986    Lipid screen  02/18/2022    Potassium monitoring  02/18/2022    Creatinine monitoring  02/18/2022    DTaP/Tdap/Td vaccine (2 - Td) 12/20/2023    Hepatitis A vaccine  Aged Out    Hepatitis B vaccine  Aged Out    Hib vaccine  Aged Out    Meningococcal (ACWY) vaccine  Aged Out    Pneumococcal 0-64 years Vaccine  Aged Out       Subjective:      Review of Systems  Review of Systems   Constitutional: Negative for chills and fever. HENT: Negative for congestion. Respiratory: Negative for cough, chest tightness and shortness of breath. Cardiovascular: Negative for chest pain, palpitations and leg swelling. Gastrointestinal: Negative for abdominal pain, anal bleeding, constipation, diarrhea and nausea. Genitourinary: Negative for difficulty urinating. Psychiatric/Behavioral: Negative. SeeI for visit specific review of symptoms. All others negative      Objective:   /82   Pulse 108   Temp 98.4 °F (36.9 °C)   Ht 5' 8\" (1.727 m)   Wt 219 lb (99.3 kg)   SpO2 98%   BMI 33.30 kg/m²   Physical Exam  Physical Exam   Constitutional: He appears well-developed. Does not appear ill. Eyes: Pupils are equal, round, and reactive to light. Conjunctiva and Lids normal.  Neck: Normal range of motion. Neck supple. No masses. Neck Symmetric. Normal tracheal position. No thyroid enlargement  Cardiovascular: Normal rate and regular rhythm. Exam reveals no friction rub. Carotid arteries: no bruit observed. No murmur heard. Respiratory:  Effort normal and breath sounds normal. No respiratory distress. No wheezes. No rales. No use of accessory muscles or intercostal retractions. Abdominal: Soft. Bowel sounds are normal. exhibits no distension. There is no tenderness. There is no rebound and no guarding. Musculoskeletal: exhibits no edema. Normal gait. Neurological: alert. Psychiatric: normal mood and affect. His behavior is normal. Normal judgement and insight observed.       Recent Results (from the past 672 hour(s))   CBC Auto Differential    Collection Time: 02/18/21  9:06 AM   Result Value Ref Range    WBC 7.0 4.8 - 10.8 K/uL    RBC 5.33 4.70 - 6.10 M/uL    Hemoglobin 16.8 14.0 - 18.0 g/dL    Hematocrit 49.2 42.0 - 52.0 %    MCV 92.3 80.0 - 94.0 fL    MCH 31.5 (H) 27.0 - 31.0 pg    MCHC 34.1 33.0 - 37.0 g/dL    RDW 12.7 11.5 - 14.5 %    Platelets 799 992 - 048 K/uL    MPV 9.3 (L) 9.4 - 12.4 fL    Neutrophils % 48.5 (L) 50.0 - 65.0 %    Lymphocytes % 37.5 20.0 - 40.0 %    Monocytes % 10.1 (H) 0.0 - 10.0 %    Eosinophils % 2.8 0.0 - 5.0 %    Basophils % 0.7 0.0 - 1.0 %    Neutrophils Absolute 3.4 1.5 - 7.5 K/uL    Immature Granulocytes # 0.0 K/uL    Lymphocytes Absolute 2.6 1.1 - 4.5 K/uL    Monocytes Absolute 0.70 0.00 - 0.90 K/uL    Eosinophils Absolute 0.20 0.00 - 0.60 K/uL Basophils Absolute 0.10 0.00 - 0.20 K/uL   Testosterone    Collection Time: 02/18/21  9:06 AM   Result Value Ref Range    Testosterone 1,150.0 (H) 249.0 - 836.0 ng/dL   Lipid Panel    Collection Time: 02/18/21  9:06 AM   Result Value Ref Range    Cholesterol, Total 214 (H) 160 - 199 mg/dL    Triglycerides 265 (H) 0 - 149 mg/dL    HDL 42 (L) 55 - 121 mg/dL    LDL Calculated 119 <100 mg/dL   Comprehensive Metabolic Panel    Collection Time: 02/18/21  9:06 AM   Result Value Ref Range    Sodium 136 136 - 145 mmol/L    Potassium 4.2 3.5 - 5.0 mmol/L    Chloride 97 (L) 98 - 111 mmol/L    CO2 28 22 - 29 mmol/L    Anion Gap 11 7 - 19 mmol/L    Glucose 98 74 - 109 mg/dL    BUN 18 6 - 20 mg/dL    CREATININE 1.1 0.5 - 1.2 mg/dL    GFR Non-African American >60 >60    GFR African American >59 >59    Calcium 9.4 8.6 - 10.0 mg/dL    Total Protein 7.0 6.6 - 8.7 g/dL    Albumin 4.6 3.5 - 5.2 g/dL    Total Bilirubin 0.6 0.2 - 1.2 mg/dL    Alkaline Phosphatase 57 40 - 130 U/L    ALT 34 5 - 41 U/L    AST 17 5 - 40 U/L               Assessment & Plan: The following diagnoses and conditions are stable with no further orders unless indicated:  1. Hypogonadism in male  Lab Results   Component Value Date    TESTOSTERONE 1,150.0 (H) 02/18/2021     Lab Results   Component Value Date    WBC 7.0 02/18/2021    HGB 16.8 02/18/2021    HCT 49.2 02/18/2021    MCV 92.3 02/18/2021     02/18/2021     Lab Results   Component Value Date    PSA 0.96 06/09/2020    PSA 0.95 07/11/2019    PSA 0.68 06/12/2018     Reduced testosterone to 300 mg every 2 weeks. Repeat testosterone 10 days after the dose next visit. - testosterone cypionate (DEPOTESTOTERONE CYPIONATE) 200 MG/ML injection; INJECT 1.5 ML IM EVERY 14 DAYS  Dispense: 10 mL; Refill: 1  - Testosterone; Future  - CBC Auto Differential; Future    Recheck testosterone in 3 months    2. DENZEL on CPAP  Compliant and satisfied with results    3.  Hypercholesterolemia  Lab Results Component Value Date    CHOL 214 (H) 02/18/2021    CHOL 175 06/09/2020    CHOL 204 (H) 12/05/2019     Lab Results   Component Value Date    TRIG 265 (H) 02/18/2021    TRIG 272 (H) 06/09/2020    TRIG 249 (H) 12/05/2019     Lab Results   Component Value Date    HDL 42 (L) 02/18/2021    HDL 35 (L) 06/09/2020    HDL 34 (L) 12/05/2019     Lab Results   Component Value Date    LDLCALC 119 02/18/2021    LDLCALC 86 06/09/2020    LDLCALC 120 12/05/2019     No results found for: LABVLDL, VLDL  No results found for: CHOLHDLRATIO  LDL crept up from last visit encourage low-fat diet recheck next visit    4. Depression with anxiety  Stable  - Lamotrigine Level; Future            Return in about 6 months (around 8/22/2021) for Routine follow up - 15 minute visit. Discussed use, benefit, and side effects of prescribed medications. All patient questions answered. Pt voiced understanding. Reviewed health maintenance. Instructedto continue current medications, diet and exercise. Patient agreed with treatmentplan. Follow up as directed. Note dictated using Dragon Dictation software  Sometimes this dictation software makes erroneous transcriptions.

## 2021-02-22 NOTE — PATIENT INSTRUCTIONS
We are committed to providing you with the best care possible. In order to help us achieve these goals please remember to bring all medications, herbal products, and over the counter supplements with you to each visit. If your provider has ordered testing for you, please be sure to follow up with our office if you have not received results within 7 days after the testing took place. *If you receive a survey after visiting one of our offices, please take time to share your experience concerning your physician office visit. These surveys are confidential and no health information about you is shared. We are eager to improve for you and we are counting on your feedback to help make that happen.  _______________________________________________________________    COVID 19 Information:    [email or web address]  · Click \"learn more\" on the red banner at the top for Coronavirus Updates  · Click MPUDR-90 Vaccine Resources  · Click [place] TLTCO-59 Vaccination Information  · If your demographic is eligible (refer to the tier), call the number listed.   · [phone]    [email or web address]    Call your local health department for vaccine availability

## 2021-04-16 ENCOUNTER — TELEPHONE (OUTPATIENT)
Dept: FAMILY MEDICINE CLINIC | Age: 51
End: 2021-04-16

## 2021-04-16 DIAGNOSIS — G47.33 OSA ON CPAP: Primary | ICD-10-CM

## 2021-04-16 DIAGNOSIS — Z99.89 OSA ON CPAP: Primary | ICD-10-CM

## 2021-04-16 NOTE — TELEPHONE ENCOUNTER
----- Message from Shanika Tineo MD sent at 2/22/2021  4:30 PM CST -----  Regarding: cpap  He uses legacy oxygen and doesn't feel like his setting or machine are working properly. His machine is approaching 8years old and wants toknow what test or device he may qualify for to adjust the settings.

## 2021-04-22 RX ORDER — LAMOTRIGINE 100 MG/1
TABLET ORAL
Qty: 30 TABLET | Refills: 3 | Status: SHIPPED | OUTPATIENT
Start: 2021-04-22 | End: 2021-07-28

## 2021-04-22 NOTE — TELEPHONE ENCOUNTER
Forrest Maryjeffrey called to request a refill on his medication.       Last office visit : 2/22/2021   Next office visit : 6/10/2021     Requested Prescriptions     Signed Prescriptions Disp Refills    lamoTRIgine (LAMICTAL) 100 MG tablet 30 tablet 3     Sig: TAKE 1 TABLET BY MOUTH EVERY DAY     Authorizing Provider: Jhonatan Rosenthal     Ordering User: Joanne Mayen

## 2021-05-27 DIAGNOSIS — I10 ESSENTIAL (PRIMARY) HYPERTENSION: ICD-10-CM

## 2021-05-27 RX ORDER — RAMIPRIL 10 MG/1
CAPSULE ORAL
Qty: 60 CAPSULE | Refills: 5 | Status: SHIPPED | OUTPATIENT
Start: 2021-05-27 | End: 2021-11-23

## 2021-06-03 DIAGNOSIS — E29.1 HYPOGONADISM IN MALE: ICD-10-CM

## 2021-06-03 DIAGNOSIS — F41.8 DEPRESSION WITH ANXIETY: ICD-10-CM

## 2021-06-03 LAB
BASOPHILS ABSOLUTE: 0 K/UL (ref 0–0.2)
BASOPHILS RELATIVE PERCENT: 0.7 % (ref 0–1)
EOSINOPHILS ABSOLUTE: 0.2 K/UL (ref 0–0.6)
EOSINOPHILS RELATIVE PERCENT: 3.8 % (ref 0–5)
HCT VFR BLD CALC: 46.5 % (ref 42–52)
HEMOGLOBIN: 15.5 G/DL (ref 14–18)
IMMATURE GRANULOCYTES #: 0 K/UL
LYMPHOCYTES ABSOLUTE: 2 K/UL (ref 1.1–4.5)
LYMPHOCYTES RELATIVE PERCENT: 36.4 % (ref 20–40)
MCH RBC QN AUTO: 30.9 PG (ref 27–31)
MCHC RBC AUTO-ENTMCNC: 33.3 G/DL (ref 33–37)
MCV RBC AUTO: 92.6 FL (ref 80–94)
MONOCYTES ABSOLUTE: 0.6 K/UL (ref 0–0.9)
MONOCYTES RELATIVE PERCENT: 9.8 % (ref 0–10)
NEUTROPHILS ABSOLUTE: 2.8 K/UL (ref 1.5–7.5)
NEUTROPHILS RELATIVE PERCENT: 49.1 % (ref 50–65)
PDW BLD-RTO: 12.7 % (ref 11.5–14.5)
PLATELET # BLD: 212 K/UL (ref 130–400)
PMV BLD AUTO: 9.5 FL (ref 9.4–12.4)
RBC # BLD: 5.02 M/UL (ref 4.7–6.1)
TESTOSTERONE TOTAL: 454.7 NG/DL (ref 193–740)
WBC # BLD: 5.6 K/UL (ref 4.8–10.8)

## 2021-06-04 LAB — LAMOTRIGINE LEVEL: 1.2 UG/ML (ref 2.5–15)

## 2021-06-10 ENCOUNTER — OFFICE VISIT (OUTPATIENT)
Dept: FAMILY MEDICINE CLINIC | Age: 51
End: 2021-06-10
Payer: COMMERCIAL

## 2021-06-10 VITALS
SYSTOLIC BLOOD PRESSURE: 134 MMHG | OXYGEN SATURATION: 98 % | TEMPERATURE: 97.4 F | HEART RATE: 86 BPM | WEIGHT: 215 LBS | BODY MASS INDEX: 32.69 KG/M2 | DIASTOLIC BLOOD PRESSURE: 86 MMHG

## 2021-06-10 DIAGNOSIS — Z12.11 ENCOUNTER FOR SCREENING COLONOSCOPY: ICD-10-CM

## 2021-06-10 DIAGNOSIS — R53.83 OTHER FATIGUE: ICD-10-CM

## 2021-06-10 DIAGNOSIS — Z11.59 NEED FOR HEPATITIS C SCREENING TEST: ICD-10-CM

## 2021-06-10 DIAGNOSIS — E29.1 HYPOGONADISM IN MALE: ICD-10-CM

## 2021-06-10 DIAGNOSIS — G47.33 OSA ON CPAP: ICD-10-CM

## 2021-06-10 DIAGNOSIS — I10 ESSENTIAL (PRIMARY) HYPERTENSION: ICD-10-CM

## 2021-06-10 DIAGNOSIS — Z99.89 OSA ON CPAP: ICD-10-CM

## 2021-06-10 DIAGNOSIS — E78.01 FAMILIAL HYPERCHOLESTEROLEMIA: ICD-10-CM

## 2021-06-10 DIAGNOSIS — Z12.5 ENCOUNTER FOR PROSTATE CANCER SCREENING: Primary | ICD-10-CM

## 2021-06-10 DIAGNOSIS — F41.8 DEPRESSION WITH ANXIETY: ICD-10-CM

## 2021-06-10 PROCEDURE — 99214 OFFICE O/P EST MOD 30 MIN: CPT | Performed by: FAMILY MEDICINE

## 2021-06-10 RX ORDER — HYDROCHLOROTHIAZIDE 25 MG/1
25 TABLET ORAL EVERY MORNING
Qty: 30 TABLET | Refills: 5 | Status: SHIPPED | OUTPATIENT
Start: 2021-06-10 | End: 2021-11-23

## 2021-06-10 RX ORDER — SIMVASTATIN 40 MG
TABLET ORAL
Qty: 30 TABLET | Refills: 5 | Status: SHIPPED | OUTPATIENT
Start: 2021-06-10 | End: 2021-11-23

## 2021-06-10 NOTE — PROGRESS NOTES
Outpatient Medications   Medication Sig Dispense Refill    SYRINGE-NEEDLE, DISP, 3 ML (SAFETY SYRINGE/NEEDLE) 22G X 1-1/2\" 3 ML MISC Use one needle to administer testosterone every 2 weeks 12 each 3    SYRINGE-NEEDLE, DISP, 3 ML 18G X 1-1/2\" 3 ML MISC Use one syringe to draw testosterone every 2 weeks 12 each 3    simvastatin (ZOCOR) 40 MG tablet TAKE 1 TABLET BY MOUTH EVERY DAY 30 tablet 5    hydroCHLOROthiazide (HYDRODIURIL) 25 MG tablet Take 1 tablet by mouth every morning 30 tablet 5    ramipril (ALTACE) 10 MG capsule TAKE 2 CAPSULES BY MOUTH DAILY 60 capsule 5    lamoTRIgine (LAMICTAL) 100 MG tablet TAKE 1 TABLET BY MOUTH EVERY DAY 30 tablet 3    CPAP Machine MISC CPAP and supplies 1 each 0    testosterone cypionate (DEPOTESTOTERONE CYPIONATE) 200 MG/ML injection INJECT 1.5 ML IM EVERY 14 DAYS 10 mL 1    buPROPion (WELLBUTRIN XL) 150 MG extended release tablet TAKE 1 TABLET BY MOUTH EVERY DAY IN THE MORNING 90 tablet 3    omeprazole (PRILOSEC) 20 MG delayed release capsule Take 1 capsule by mouth Daily 30 capsule 5     No current facility-administered medications for this visit. Allergies   Allergen Reactions    Azithromycin     Flu Virus Vaccine     Penicillins        Health Maintenance   Topic Date Due    Hepatitis C screen  Never done    COVID-19 Vaccine (1) Never done    HIV screen  Never done    Shingles Vaccine (1 of 2) Never done    Colon cancer screen colonoscopy  Never done    Lipid screen  02/18/2022    Potassium monitoring  02/18/2022    Creatinine monitoring  02/18/2022    DTaP/Tdap/Td vaccine (2 - Td or Tdap) 12/20/2023    Hepatitis A vaccine  Aged Out    Hepatitis B vaccine  Aged Out    Hib vaccine  Aged Out    Meningococcal (ACWY) vaccine  Aged Out    Pneumococcal 0-64 years Vaccine  Aged Out       Subjective:      Review of Systems    Review of Systems   Constitutional: Negative for chills and fever. HENT: Negative for congestion.     Respiratory: Negative for cough, chest tightness and shortness of breath. Cardiovascular: Negative for chest pain, palpitations and leg swelling. Gastrointestinal: Negative for abdominal pain, anal bleeding, constipation, diarrhea and nausea. Genitourinary: Negative for difficulty urinating. Psychiatric/Behavioral: Negative. SeeHPI for visit specific review of symptoms. All others negative      Objective:   /86   Pulse 86   Temp 97.4 °F (36.3 °C)   Wt 215 lb (97.5 kg)   SpO2 98%   BMI 32.69 kg/m²   Physical Exam  Physical Exam   Constitutional: He appears well-developed. Does not appear ill. Neck: Normal range of motion. Neck supple. No masses. Neck Symmetric. Normal tracheal position. No thyroid enlargement  Cardiovascular: Normal rate and regular rhythm. Exam reveals no friction rub. Carotid arteries: no bruit observed. No murmur heard. Respiratory:  Effort normal and breath sounds normal. No respiratory distress. No wheezes. No rales. No use of accessory muscles or intercostal retractions. Neurological: alert. Psychiatric: normal mood and affect. His behavior is normal. Normal judgement and insight observed.       Recent Results (from the past 672 hour(s))   Lamotrigine Level    Collection Time: 06/03/21  8:02 AM   Result Value Ref Range    Lamotrigine Lvl 1.2 (L) 2.5 - 15.0 ug/mL   Testosterone    Collection Time: 06/03/21  8:03 AM   Result Value Ref Range    Testosterone 454.7 193.0 - 740.0 ng/dL   CBC Auto Differential    Collection Time: 06/03/21  8:04 AM   Result Value Ref Range    WBC 5.6 4.8 - 10.8 K/uL    RBC 5.02 4.70 - 6.10 M/uL    Hemoglobin 15.5 14.0 - 18.0 g/dL    Hematocrit 46.5 42.0 - 52.0 %    MCV 92.6 80.0 - 94.0 fL    MCH 30.9 27.0 - 31.0 pg    MCHC 33.3 33.0 - 37.0 g/dL    RDW 12.7 11.5 - 14.5 %    Platelets 693 164 - 075 K/uL    MPV 9.5 9.4 - 12.4 fL    Neutrophils % 49.1 (L) 50.0 - 65.0 %    Lymphocytes % 36.4 20.0 - 40.0 %    Monocytes % 9.8 0.0 - 10.0 %    Eosinophils % 3.8 0.0 - 5.0 %    Basophils % 0.7 0.0 - 1.0 %    Neutrophils Absolute 2.8 1.5 - 7.5 K/uL    Immature Granulocytes # 0.0 K/uL    Lymphocytes Absolute 2.0 1.1 - 4.5 K/uL    Monocytes Absolute 0.60 0.00 - 0.90 K/uL    Eosinophils Absolute 0.20 0.00 - 0.60 K/uL    Basophils Absolute 0.00 0.00 - 0.20 K/uL               Assessment & Plan: The following diagnoses and conditions are stable with no further orders unless indicated:  1. Hypogonadism in male  Lab Results   Component Value Date    TESTOSTERONE 454.7 06/03/2021     Lab Results   Component Value Date    WBC 5.6 06/03/2021    HGB 15.5 06/03/2021    HCT 46.5 06/03/2021    MCV 92.6 06/03/2021     06/03/2021     Lab Results   Component Value Date    PSA 0.96 06/09/2020    PSA 0.95 07/11/2019    PSA 0.68 06/12/2018     Stable continue current medication  - SYRINGE-NEEDLE, DISP, 3 ML (SAFETY SYRINGE/NEEDLE) 22G X 1-1/2\" 3 ML MISC; Use one needle to administer testosterone every 2 weeks  Dispense: 12 each; Refill: 3  - SYRINGE-NEEDLE, DISP, 3 ML 18G X 1-1/2\" 3 ML MISC; Use one syringe to draw testosterone every 2 weeks  Dispense: 12 each; Refill: 3  - Testosterone; Future    2. Essential (primary) hypertension  BP Readings from Last 3 Encounters:   06/10/21 134/86   02/22/21 122/82   10/12/20 132/82     Stable  - hydroCHLOROthiazide (HYDRODIURIL) 25 MG tablet; Take 1 tablet by mouth every morning  Dispense: 30 tablet; Refill: 5    3.  Familial hypercholesterolemia  Lab Results   Component Value Date    CHOL 214 (H) 02/18/2021    CHOL 175 06/09/2020    CHOL 204 (H) 12/05/2019     Lab Results   Component Value Date    TRIG 265 (H) 02/18/2021    TRIG 272 (H) 06/09/2020    TRIG 249 (H) 12/05/2019     Lab Results   Component Value Date    HDL 42 (L) 02/18/2021    HDL 35 (L) 06/09/2020    HDL 34 (L) 12/05/2019     Lab Results   Component Value Date    LDLCALC 119 02/18/2021    LDLCALC 86 06/09/2020    LDLCALC 120 12/05/2019     No results found for: LABVLDL, VLDL  No results found for: CHOLHDLRATIO  Stable  - simvastatin (ZOCOR) 40 MG tablet; TAKE 1 TABLET BY MOUTH EVERY DAY  Dispense: 30 tablet; Refill: 5  - Lipid Panel; Future    4. Encounter for prostate cancer screening    - PSA screening; Future    5. Other fatigue    - Comprehensive Metabolic Panel; Future  - CBC Auto Differential; Future    6. Encounter for screening colonoscopy  Needs a screening colonoscopy  - Basil Gallagher MD, Gastroenterology, [place]    7. Need for hepatitis C screening test  Compliant and satisfied with results  - Hepatitis C Antibody; Future    8. DENZEL on CPAP  Compliant satisfied with results        9. Depression with anxiety  stable            Return in about 6 months (around 12/10/2021) for Routine follow up - 15 minute visit. Discussed use, benefit, and side effects of prescribed medications. All patient questions answered. Pt voiced understanding. Reviewed health maintenance. Instructedto continue current medications, diet and exercise. Patient agreed with treatmentplan. Follow up as directed. Note dictated using Dragon Dictation software  Sometimes this dictation software makes erroneous transcriptions.

## 2021-07-28 RX ORDER — LAMOTRIGINE 100 MG/1
TABLET ORAL
Qty: 30 TABLET | Refills: 3 | Status: SHIPPED | OUTPATIENT
Start: 2021-07-28 | End: 2021-11-23

## 2021-07-28 NOTE — TELEPHONE ENCOUNTER
Samantha Valdez called requesting a refill of the below medication which has been pended for you:     Requested Prescriptions     Pending Prescriptions Disp Refills    lamoTRIgine (LAMICTAL) 100 MG tablet [Pharmacy Med Name: LAMOTRIGINE 100 MG TABLET] 30 tablet 3     Sig: TAKE 1 TABLET BY MOUTH EVERY DAY       Last Appointment Date: 6/10/2021  Next Appointment Date: 12/13/2021    Allergies   Allergen Reactions    Azithromycin     Flu Virus Vaccine     Penicillins

## 2021-11-22 ENCOUNTER — OFFICE VISIT (OUTPATIENT)
Dept: URGENT CARE | Age: 51
End: 2021-11-22
Payer: COMMERCIAL

## 2021-11-22 ENCOUNTER — PATIENT MESSAGE (OUTPATIENT)
Dept: FAMILY MEDICINE CLINIC | Age: 51
End: 2021-11-22

## 2021-11-22 VITALS
RESPIRATION RATE: 16 BRPM | BODY MASS INDEX: 33.49 KG/M2 | DIASTOLIC BLOOD PRESSURE: 92 MMHG | HEIGHT: 68 IN | TEMPERATURE: 97.6 F | HEART RATE: 110 BPM | WEIGHT: 221 LBS | SYSTOLIC BLOOD PRESSURE: 133 MMHG | OXYGEN SATURATION: 96 %

## 2021-11-22 DIAGNOSIS — B02.9 HERPES ZOSTER WITHOUT COMPLICATION: Primary | ICD-10-CM

## 2021-11-22 PROCEDURE — 99203 OFFICE O/P NEW LOW 30 MIN: CPT | Performed by: NURSE PRACTITIONER

## 2021-11-22 PROCEDURE — 96372 THER/PROPH/DIAG INJ SC/IM: CPT | Performed by: NURSE PRACTITIONER

## 2021-11-22 RX ORDER — DEXAMETHASONE SODIUM PHOSPHATE 10 MG/ML
5 INJECTION INTRAMUSCULAR; INTRAVENOUS ONCE
Status: COMPLETED | OUTPATIENT
Start: 2021-11-22 | End: 2021-11-22

## 2021-11-22 RX ORDER — VALACYCLOVIR HYDROCHLORIDE 1 G/1
1000 TABLET, FILM COATED ORAL 3 TIMES DAILY
Qty: 21 TABLET | Refills: 0 | Status: SHIPPED | OUTPATIENT
Start: 2021-11-22 | End: 2021-11-29

## 2021-11-22 RX ORDER — METHYLPREDNISOLONE 4 MG/1
TABLET ORAL
Qty: 1 KIT | Refills: 0 | Status: SHIPPED | OUTPATIENT
Start: 2021-11-22 | End: 2022-02-09 | Stop reason: ALTCHOICE

## 2021-11-22 RX ADMIN — DEXAMETHASONE SODIUM PHOSPHATE 5 MG: 10 INJECTION INTRAMUSCULAR; INTRAVENOUS at 14:42

## 2021-11-22 NOTE — TELEPHONE ENCOUNTER
From: Bridget Singer  To: Dr. Benavidez Bend: 11/22/2021 10:48 AM CST  Subject: Non-Urgent Medical Question    I called and told office that I may have shingles and could I be seen by Dr. Brenda Marquez today. I was told he had no openings for the rest of the month. I have an appointment for next marilyn on the 13th. ... I attached two photos. Could this be shingles.

## 2021-11-22 NOTE — PATIENT INSTRUCTIONS
Patient Education        Shingles: Care Instructions  Your Care Instructions     Shingles (herpes zoster) causes pain and a blistered rash. The rash can appear anywhere on the body but will be on only one side of the body, the left or right. It will be in a band, a strip, or a small area. The pain can be very severe. Shingles can also cause tingling or itching in the area of the rash. The blisters scab over after a few days and heal in 2 to 4 weeks. Medicines can help you feel better and may help prevent more serious problems caused by shingles. Shingles is caused by the same virus that causes chickenpox. When you have chickenpox, the virus gets into your nerve roots and stays there (becomes dormant) long after you get over the chickenpox. If the virus becomes active again, it can cause shingles. Follow-up care is a key part of your treatment and safety. Be sure to make and go to all appointments, and call your doctor if you are having problems. It's also a good idea to know your test results and keep a list of the medicines you take. How can you care for yourself at home? · Be safe with medicines. Take your medicines exactly as prescribed. Call your doctor if you think you are having a problem with your medicine. Antiviral medicine helps you get better faster. · Try not to scratch or pick at the blisters. They will crust over and fall off on their own if you leave them alone. · Put cool, wet cloths on the area to relieve pain and itching. You can also use calamine lotion. Try not to use so much lotion that it cakes and is hard to get off. · Put cornstarch or baking soda on the sores to help dry them out so they heal faster. · Do not use thick ointment, such as petroleum jelly, on the sores. This will keep them from drying and healing. · To help remove loose crusts, soak them in tap water. This can help decrease oozing, and dry and soothe the skin.   · Take an over-the-counter pain medicine, such as acetaminophen (Tylenol), ibuprofen (Advil, Motrin), or naproxen (Aleve). Read and follow all instructions on the label. · Avoid close contact with people until the blisters have healed. It is very important for you to avoid contact with anyone who has never had chickenpox or the chickenpox vaccine. Pregnant women, young babies, and anyone else who has a hard time fighting infection (such as someone with HIV, diabetes, or cancer) is especially at risk. When should you call for help? Call your doctor now or seek immediate medical care if:    · You have a new or higher fever.     · You have a severe headache and a stiff neck.     · You lose the ability to think clearly.     · The rash spreads to your forehead, nose, eyes, or eyelids.     · You have eye pain, or your vision gets worse.     · You have new pain in your face, or you cannot move the muscles in your face.     · Blisters spread to new parts of your body. Watch closely for changes in your health, and be sure to contact your doctor if:    · The rash has not healed after 2 to 4 weeks.     · You still have pain after the rash has healed. Where can you learn more? Go to [email or web address] and sign in to your [employer] account. Mikal Sols in the [facility] box to learn more about \"Shingles: Care Instructions. \"     If you do not have an account, please click on the \"Sign Up Now\" link. Current as of: July 1, 2021               Content Version: 13.0  © 2006-2021 [employer]. Care instructions adapted under license by [facility] ([facility]). If you have questions about a medical condition or this instruction, always ask your healthcare professional. [place] [address] any warranty or liability for your use of this information. Avoid close contact with people until the blisters have healed.  It is very important for you to avoid contact with anyone who has never had chickenpox or the chickenpox vaccine. Pregnant women, young babies, and anyone else who has a hard time fighting infection (such as someone with HIV, diabetes, or cancer) is especially at risk. 1st dose of chicken pox vaccine is given at 15months of age. Then second dose at 3-5 years old.      Start steroid pack tomorrow   Valtrex as prescribed

## 2021-11-22 NOTE — PROGRESS NOTES
[address] [facility]  [address] [facility] [address] [id number]  Dept: [phone]  Dept Fax: [phone]  Loc: [phone]    Kaleigh Mccann is a 49 y.o. male who presents today for his medical conditions/complaintsas noted below. Kaleigh Mccann is c/o of Rash        HPI:     Rash  This is a new problem. Episode onset: Friday night. The problem is unchanged. The affected locations include the abdomen. The rash is characterized by blistering, redness, draining, itchiness and pain. It is unknown if there was an exposure to a precipitant. Pertinent negatives include no fever. Treatments tried: baking soda and corn startch. The treatment provided no relief. Past Medical History:   Diagnosis Date    Hyperlipidemia     Hypertension     Kidney stone      Past Surgical History:   Procedure Laterality Date    CARPAL TUNNEL RELEASE      Right    LITHOTRIPSY         Family History   Problem Relation Age of Onset    Diabetes Mother     High Blood Pressure Mother     Diabetes Father     Heart Disease Father     High Cholesterol Father        Social History     Tobacco Use    Smoking status: Never Smoker    Smokeless tobacco: Never Used   Substance Use Topics    Alcohol use: No      Current Outpatient Medications   Medication Sig Dispense Refill    methylPREDNISolone (MEDROL DOSEPACK) 4 MG tablet Take by mouth.  1 kit 0    valACYclovir (VALTREX) 1 g tablet Take 1 tablet by mouth 3 times daily for 7 days 21 tablet 0    lamoTRIgine (LAMICTAL) 100 MG tablet TAKE 1 TABLET BY MOUTH EVERY DAY 30 tablet 3    SYRINGE-NEEDLE, DISP, 3 ML (SAFETY SYRINGE/NEEDLE) 22G X 1-1/2\" 3 ML MISC Use one needle to administer testosterone every 2 weeks 12 each 3    SYRINGE-NEEDLE, DISP, 3 ML 18G X 1-1/2\" 3 ML MISC Use one syringe to draw testosterone every 2 weeks 12 each 3    simvastatin (ZOCOR) 40 MG tablet TAKE 1 TABLET BY MOUTH EVERY DAY 30 tablet 5    hydroCHLOROthiazide is normal. No respiratory distress. Breath sounds: No wheezing. Skin:     General: Skin is warm and dry. Findings: Rash (vesicular and papular lesions in clusters and linear pattern. some are draining. ) present. Neurological:      General: No focal deficit present. Mental Status: He is alert and oriented to person, place, and time. Psychiatric:         Mood and Affect: Mood normal.         Behavior: Behavior normal.       BP (!) 133/92   Pulse 110   Temp 97.6 °F (36.4 °C)   Resp 16   Ht 5' 8\" (1.727 m)   Wt 221 lb (100.2 kg)   SpO2 96%   BMI 33.60 kg/m²     :Assessment       Diagnosis Orders   1. Herpes zoster without complication  methylPREDNISolone (MEDROL DOSEPACK) 4 MG tablet    valACYclovir (VALTREX) 1 g tablet       :Plan      Avoid close contact with people until the blisters have healed. It is very important for you to avoid contact with anyone who has never had chickenpox or the chickenpox vaccine. Pregnant women, young babies, and anyone else who has a hard time fighting infection (such as someone with HIV, diabetes, or cancer) is especially at risk. 1st dose of chicken pox vaccine is given at 15months of age. Then second dose at 3-5 years old. Start steroid pack tomorrow   Valtrex as prescribed        No orders of the defined types were placed in this encounter. No follow-ups on file. Orders Placed This Encounter   Medications    dexamethasone (DECADRON) injection 5 mg    methylPREDNISolone (MEDROL DOSEPACK) 4 MG tablet     Sig: Take by mouth. Dispense:  1 kit     Refill:  0    valACYclovir (VALTREX) 1 g tablet     Sig: Take 1 tablet by mouth 3 times daily for 7 days     Dispense:  21 tablet     Refill:  0       Patient given educational materials- see patient instructions. Discussed use, benefit, and side effects of prescribedmedications. All patient questions answered. Pt voiced understanding.      Patient Instructions       Patient Education Shingles: Care Instructions  Your Care Instructions     Shingles (herpes zoster) causes pain and a blistered rash. The rash can appear anywhere on the body but will be on only one side of the body, the left or right. It will be in a band, a strip, or a small area. The pain can be very severe. Shingles can also cause tingling or itching in the area of the rash. The blisters scab over after a few days and heal in 2 to 4 weeks. Medicines can help you feel better and may help prevent more serious problems caused by shingles. Shingles is caused by the same virus that causes chickenpox. When you have chickenpox, the virus gets into your nerve roots and stays there (becomes dormant) long after you get over the chickenpox. If the virus becomes active again, it can cause shingles. Follow-up care is a key part of your treatment and safety. Be sure to make and go to all appointments, and call your doctor if you are having problems. It's also a good idea to know your test results and keep a list of the medicines you take. How can you care for yourself at home? · Be safe with medicines. Take your medicines exactly as prescribed. Call your doctor if you think you are having a problem with your medicine. Antiviral medicine helps you get better faster. · Try not to scratch or pick at the blisters. They will crust over and fall off on their own if you leave them alone. · Put cool, wet cloths on the area to relieve pain and itching. You can also use calamine lotion. Try not to use so much lotion that it cakes and is hard to get off. · Put cornstarch or baking soda on the sores to help dry them out so they heal faster. · Do not use thick ointment, such as petroleum jelly, on the sores. This will keep them from drying and healing. · To help remove loose crusts, soak them in tap water. This can help decrease oozing, and dry and soothe the skin.   · Take an over-the-counter pain medicine, such as acetaminophen (Tylenol), ibuprofen (Advil, Motrin), or naproxen (Aleve). Read and follow all instructions on the label. · Avoid close contact with people until the blisters have healed. It is very important for you to avoid contact with anyone who has never had chickenpox or the chickenpox vaccine. Pregnant women, young babies, and anyone else who has a hard time fighting infection (such as someone with HIV, diabetes, or cancer) is especially at risk. When should you call for help? Call your doctor now or seek immediate medical care if:    · You have a new or higher fever.     · You have a severe headache and a stiff neck.     · You lose the ability to think clearly.     · The rash spreads to your forehead, nose, eyes, or eyelids.     · You have eye pain, or your vision gets worse.     · You have new pain in your face, or you cannot move the muscles in your face.     · Blisters spread to new parts of your body. Watch closely for changes in your health, and be sure to contact your doctor if:    · The rash has not healed after 2 to 4 weeks.     · You still have pain after the rash has healed. Where can you learn more? Go to [email or web address] and sign in to your Be[employer] account. Erasto Bowman in the [facility] box to learn more about \"Shingles: Care Instructions. \"     If you do not have an account, please click on the \"Sign Up Now\" link. Current as of: July 1, 2021               Content Version: 13.0  © 2006-2021 [employer]. Care instructions adapted under license by [facility] ([facility]). If you have questions about a medical condition or this instruction, always ask your healthcare professional. [place] [address] any warranty or liability for your use of this information. Avoid close contact with people until the blisters have healed. It is very important for you to avoid contact with anyone who has never had chickenpox or the chickenpox vaccine.  Pregnant women, young babies, and anyone else who has a hard time fighting infection (such as someone with HIV, diabetes, or cancer) is especially at risk. 1st dose of chicken pox vaccine is given at 15months of age. Then second dose at 3-5 years old.      Start steroid pack tomorrow   Valtrex as prescribed         Electronically signed by ELLEN Piedra on 11/22/2021 at 2:40 PM

## 2021-11-23 DIAGNOSIS — I10 ESSENTIAL (PRIMARY) HYPERTENSION: ICD-10-CM

## 2021-11-23 DIAGNOSIS — E78.01 FAMILIAL HYPERCHOLESTEROLEMIA: ICD-10-CM

## 2021-11-23 RX ORDER — LAMOTRIGINE 100 MG/1
TABLET ORAL
Qty: 30 TABLET | Refills: 3 | Status: SHIPPED | OUTPATIENT
Start: 2021-11-23 | End: 2022-04-01

## 2021-11-23 RX ORDER — SIMVASTATIN 40 MG
TABLET ORAL
Qty: 30 TABLET | Refills: 5 | Status: SHIPPED | OUTPATIENT
Start: 2021-11-23 | End: 2021-12-13 | Stop reason: ALTCHOICE

## 2021-11-23 RX ORDER — RAMIPRIL 10 MG/1
CAPSULE ORAL
Qty: 60 CAPSULE | Refills: 5 | Status: ON HOLD | OUTPATIENT
Start: 2021-11-23 | End: 2022-06-03 | Stop reason: SDUPTHER

## 2021-11-23 RX ORDER — HYDROCHLOROTHIAZIDE 25 MG/1
25 TABLET ORAL EVERY MORNING
Qty: 30 TABLET | Refills: 5 | Status: SHIPPED | OUTPATIENT
Start: 2021-11-23

## 2021-11-23 NOTE — TELEPHONE ENCOUNTER
Fabby Connelly called requesting a refill of the below medication which has been pended for you:     Requested Prescriptions     Pending Prescriptions Disp Refills    lamoTRIgine (LAMICTAL) 100 MG tablet [Pharmacy Med Name: LAMOTRIGINE 100 MG TABLET] 30 tablet 3     Sig: TAKE 1 TABLET BY MOUTH EVERY DAY    hydroCHLOROthiazide (HYDRODIURIL) 25 MG tablet [Pharmacy Med Name: HYDROCHLOROTHIAZIDE 25 MG TAB] 30 tablet 5     Sig: TAKE 1 TABLET BY MOUTH EVERY MORNING    simvastatin (ZOCOR) 40 MG tablet [Pharmacy Med Name: SIMVASTATIN 40 MG TABLET] 30 tablet 5     Sig: TAKE 1 TABLET BY MOUTH EVERY DAY    ramipril (ALTACE) 10 MG capsule [Pharmacy Med Name: RAMIPRIL 10 MG CAPSULE] 60 capsule 5     Sig: TAKE 2 CAPSULES BY MOUTH EVERY DAY       Last Appointment Date: 6/10/2021  Next Appointment Date: 12/13/2021    Allergies   Allergen Reactions    Azithromycin     Flu Virus Vaccine     Penicillins

## 2021-11-29 DIAGNOSIS — F41.8 DEPRESSION WITH ANXIETY: ICD-10-CM

## 2021-11-29 RX ORDER — BUPROPION HYDROCHLORIDE 150 MG/1
TABLET ORAL
Qty: 90 TABLET | Refills: 3 | Status: SHIPPED | OUTPATIENT
Start: 2021-11-29 | End: 2022-01-25 | Stop reason: SDUPTHER

## 2021-12-06 DIAGNOSIS — Z11.59 NEED FOR HEPATITIS C SCREENING TEST: ICD-10-CM

## 2021-12-06 DIAGNOSIS — E29.1 HYPOGONADISM IN MALE: ICD-10-CM

## 2021-12-06 DIAGNOSIS — Z12.5 ENCOUNTER FOR PROSTATE CANCER SCREENING: ICD-10-CM

## 2021-12-06 DIAGNOSIS — R53.83 OTHER FATIGUE: ICD-10-CM

## 2021-12-06 DIAGNOSIS — E78.01 FAMILIAL HYPERCHOLESTEROLEMIA: ICD-10-CM

## 2021-12-06 LAB
ALBUMIN SERPL-MCNC: 4.7 G/DL (ref 3.5–5.2)
ALP BLD-CCNC: 57 U/L (ref 40–130)
ALT SERPL-CCNC: 42 U/L (ref 5–41)
ANION GAP SERPL CALCULATED.3IONS-SCNC: 13 MMOL/L (ref 7–19)
AST SERPL-CCNC: 23 U/L (ref 5–40)
BASOPHILS ABSOLUTE: 0.1 K/UL (ref 0–0.2)
BASOPHILS RELATIVE PERCENT: 0.6 % (ref 0–1)
BILIRUB SERPL-MCNC: 0.6 MG/DL (ref 0.2–1.2)
BUN BLDV-MCNC: 17 MG/DL (ref 6–20)
CALCIUM SERPL-MCNC: 9.6 MG/DL (ref 8.6–10)
CHLORIDE BLD-SCNC: 97 MMOL/L (ref 98–111)
CHOLESTEROL, TOTAL: 230 MG/DL (ref 160–199)
CO2: 28 MMOL/L (ref 22–29)
CREAT SERPL-MCNC: 1.3 MG/DL (ref 0.5–1.2)
EOSINOPHILS ABSOLUTE: 0.3 K/UL (ref 0–0.6)
EOSINOPHILS RELATIVE PERCENT: 3.4 % (ref 0–5)
GFR AFRICAN AMERICAN: >59
GFR NON-AFRICAN AMERICAN: 58
GLUCOSE BLD-MCNC: 100 MG/DL (ref 74–109)
HCT VFR BLD CALC: 51.3 % (ref 42–52)
HDLC SERPL-MCNC: 39 MG/DL (ref 55–121)
HEMOGLOBIN: 16.9 G/DL (ref 14–18)
HEPATITIS C ANTIBODY INTERPRETATION: NORMAL
IMMATURE GRANULOCYTES #: 0 K/UL
LDL CHOLESTEROL CALCULATED: 140 MG/DL
LYMPHOCYTES ABSOLUTE: 1.8 K/UL (ref 1.1–4.5)
LYMPHOCYTES RELATIVE PERCENT: 23 % (ref 20–40)
MCH RBC QN AUTO: 31.4 PG (ref 27–31)
MCHC RBC AUTO-ENTMCNC: 32.9 G/DL (ref 33–37)
MCV RBC AUTO: 95.4 FL (ref 80–94)
MONOCYTES ABSOLUTE: 0.6 K/UL (ref 0–0.9)
MONOCYTES RELATIVE PERCENT: 7.5 % (ref 0–10)
NEUTROPHILS ABSOLUTE: 5.1 K/UL (ref 1.5–7.5)
NEUTROPHILS RELATIVE PERCENT: 65.1 % (ref 50–65)
PDW BLD-RTO: 12.6 % (ref 11.5–14.5)
PLATELET # BLD: 258 K/UL (ref 130–400)
PMV BLD AUTO: 9.6 FL (ref 9.4–12.4)
POTASSIUM SERPL-SCNC: 4 MMOL/L (ref 3.5–5)
PROSTATE SPECIFIC ANTIGEN: 0.78 NG/ML (ref 0–4)
RBC # BLD: 5.38 M/UL (ref 4.7–6.1)
SODIUM BLD-SCNC: 138 MMOL/L (ref 136–145)
TESTOSTERONE TOTAL: 676.7 NG/DL (ref 193–740)
TOTAL PROTEIN: 7.2 G/DL (ref 6.6–8.7)
TRIGL SERPL-MCNC: 254 MG/DL (ref 0–149)
WBC # BLD: 7.9 K/UL (ref 4.8–10.8)

## 2021-12-13 ENCOUNTER — OFFICE VISIT (OUTPATIENT)
Dept: FAMILY MEDICINE CLINIC | Age: 51
End: 2021-12-13
Payer: COMMERCIAL

## 2021-12-13 VITALS
BODY MASS INDEX: 34.1 KG/M2 | SYSTOLIC BLOOD PRESSURE: 136 MMHG | HEIGHT: 68 IN | WEIGHT: 225 LBS | OXYGEN SATURATION: 99 % | TEMPERATURE: 98.6 F | DIASTOLIC BLOOD PRESSURE: 86 MMHG | HEART RATE: 86 BPM

## 2021-12-13 DIAGNOSIS — F41.8 DEPRESSION WITH ANXIETY: Primary | ICD-10-CM

## 2021-12-13 DIAGNOSIS — K21.9 GASTROESOPHAGEAL REFLUX DISEASE WITHOUT ESOPHAGITIS: ICD-10-CM

## 2021-12-13 DIAGNOSIS — Z99.89 OSA ON CPAP: ICD-10-CM

## 2021-12-13 DIAGNOSIS — E78.00 HYPERCHOLESTEROLEMIA: ICD-10-CM

## 2021-12-13 DIAGNOSIS — G47.33 OSA ON CPAP: ICD-10-CM

## 2021-12-13 DIAGNOSIS — E29.1 HYPOGONADISM IN MALE: ICD-10-CM

## 2021-12-13 DIAGNOSIS — I10 ESSENTIAL (PRIMARY) HYPERTENSION: ICD-10-CM

## 2021-12-13 PROCEDURE — 99214 OFFICE O/P EST MOD 30 MIN: CPT | Performed by: FAMILY MEDICINE

## 2021-12-13 RX ORDER — TESTOSTERONE CYPIONATE 200 MG/ML
INJECTION INTRAMUSCULAR
Qty: 10 ML | Refills: 1 | Status: SHIPPED | OUTPATIENT
Start: 2021-12-13 | End: 2022-03-24 | Stop reason: SDUPTHER

## 2021-12-13 RX ORDER — ATORVASTATIN CALCIUM 40 MG/1
40 TABLET, FILM COATED ORAL DAILY
Qty: 90 TABLET | Refills: 1 | Status: ON HOLD | OUTPATIENT
Start: 2021-12-13 | End: 2022-06-03 | Stop reason: SDUPTHER

## 2021-12-13 SDOH — ECONOMIC STABILITY: FOOD INSECURITY: WITHIN THE PAST 12 MONTHS, YOU WORRIED THAT YOUR FOOD WOULD RUN OUT BEFORE YOU GOT MONEY TO BUY MORE.: NEVER TRUE

## 2021-12-13 SDOH — ECONOMIC STABILITY: FOOD INSECURITY: WITHIN THE PAST 12 MONTHS, THE FOOD YOU BOUGHT JUST DIDN'T LAST AND YOU DIDN'T HAVE MONEY TO GET MORE.: NEVER TRUE

## 2021-12-13 ASSESSMENT — SOCIAL DETERMINANTS OF HEALTH (SDOH): HOW HARD IS IT FOR YOU TO PAY FOR THE VERY BASICS LIKE FOOD, HOUSING, MEDICAL CARE, AND HEATING?: NOT HARD AT ALL

## 2021-12-13 NOTE — PATIENT INSTRUCTIONS
What Everyone Should Know about Shingles Vaccine (Shingrix)    CONTACT YOUR INSURANCE TO SEE IF YOUR POLICY COVERS THIS VACCINE    One of the Recommended Vaccines  Shingles vaccination is the only way to protect against shingles and postherpetic neuralgia (PHN), the most common complication from shingles. CDC recommends that healthy adults 50 years and older get two doses of the shingles vaccine called Shingrix®,  by 2 to 6 months, to prevent shingles and the complications from the disease. Your doctor or pharmacist can give you Shingrix as a shot in your upper arm. Shingrix provides strong protection against shingles and PHN. Two doses of Shingrix is more than 90% effective at preventing shingles and PHN. Protection stays above 85% for at least the first four years after you get vaccinated. Shingrix is the preferred vaccine, over Zostavax®, a shingles vaccine in use since 2006. Who Should Get Shingrix? Healthy adults 50 years and older should get two doses of Shingrix,  by 2 to 6 months. You should get Shingrix even if in the past you   had shingles   received Zostavax   are not sure if you had chickenpox  Vaccine for Those 50 Years and Older  Shingrix reduces the risk of shingles and PHN by more than 90% in people 48 and older. CDC recommends the vaccine for healthy adults 48 and older. There is no maximum age for getting Shingrix. If you had shingles in the past, you can get Shingrix to help prevent future occurrences of the disease. There is no specific length of time that you need to wait after having shingles before you can receive Shingrix, but generally you should make sure the shingles rash has gone away before getting vaccinated. You can get Shingrix whether or not you remember having had chickenpox in the past. Studies show that more than 99% of Americans 40 years and older have had chickenpox, even if they dont remember having the disease.  Chickenpox and shingles are related because they are caused by the same virus (varicella zoster virus). After a person recovers from chickenpox, the virus stays dormant (inactive) in the body. It can reactivate years later and cause shingles. If you had Zostavax in the recent past, you should wait at least eight weeks before getting Shingrix. Talk to your healthcare provider to determine the best time to get Shingrix. Shingrix is available in [place] and pharmacies. If you have questions about Shingrix, talk with your healthcare provider. Who Should Not Get Shingrix? The side effects of the Shingrix are temporary, and usually last 2 to 3 days. While you may experience pain for a few days after getting Shingrix, the pain will be less severe than having shingles and the complications from the disease. You should not get Shingrix if you:   have ever had a severe allergic reaction to any component of the vaccine or after a dose of Shingrix   tested negative for immunity to varicella zoster virus. If you test negative, you should get chickenpox vaccine.  currently have shingles   currently are pregnant or breastfeeding. Women who are pregnant or breastfeeding should wait to get Shingrix. If you have a minor acute (starts suddenly) illness, such as a cold, you may get Shingrix. But if you have a moderate or severe acute illness, you should usually wait until you recover before getting the vaccine. This includes anyone with a temperature of 101.3°F or higher. How Well Does Shingrix Work? Two doses of Shingrix provides strong protection against shingles and postherpetic neuralgia (PHN), the most common complication of shingles.  In adults 48to 71years old who got two doses, Shingrix was 97% effective in preventing shingles; among adults 70 years and older, Shingrix was 91% effective.    In adults 48to 71years old who got two doses, Shingrix was 91% effective in preventing PHN; among adults 70 years and older, Shingrix was 89% effective. Shingrix protection remained high (more than 85%) in people 70 years and older throughout the four years following vaccination. Since your risk of shingles and PHN increases as you get older, it is important to have strong protection against shingles in your older years. What are the possible side effects of Shingrix? Studies show that Shingrix is safe. The vaccine helps your body create a strong defense against shingles. As a result, you are likely to have temporary side effects from getting the shots. The side effects may affect your ability to do normal daily activities for 2 to 3 days. Most people got a sore arm with mild or moderate pain after getting Shingrix, and some also had redness and swelling where they got the shot. Some people felt tired, had muscle pain, a headache, shivering, fever, stomach pain, or nausea. About 1 out of 6 people who got Shingrix experienced side effects that prevented them from doing regular activities. Symptoms went away on their own in about 2 to 3 days. Side effects were more common in younger people. You might have a reaction to the first or second dose of Shingrix, or both doses. If you experience side effects, you may choose to take over-the-counter pain medicine such as ibuprofen or acetaminophen. Severe allergic reactions to any vaccine are very rare. Signs of a severe allergic reaction can include hives, swelling of the face and throat, difficulty breathing, a fast heartbeat, dizziness, and weakness. These would start a few minutes to a few hours after the vaccination. If you have a severe allergic reaction or other emergency that cant wait, call 9-1-1 or go to the nearest hospital. Otherwise, call your doctor. If you experience side effects from Shingrix, you should report them to the Vaccine Adverse Event Reporting System (VAERS).  Your doctor might file this report, or you can do it yourself through the VAERS website, or by calling [phone]. If you have any questions about side effects from Shingrix, talk with your doctor. The shingles vaccine does not contain thimerosal (a preservative containing mercury). How Can I Pay For Shingrix? There are several ways shingles vaccine may be paid for:   Medicare   Medicare Part D plans cover the shingles vaccine, but there may be a cost to you depending on your plan. There may be a copay for the vaccine, or you may need to pay in full then get reimbursed for a certain amount.  Medicare Part B does not cover the shingles vaccine. Medicaid   Medicaid may or may not cover the vaccine. Contact your insurer to find out. Private health insurance   Many private health insurance plans will cover the vaccine. Contact your insurer to find out. Vaccine assistance programs   Some pharmaceutical companies provide vaccines to eligible adults who cannot afford them. You may want to check with the vaccine , [employer], about Shingrix. _______________________________________________________________    Coenzyme Q10: 200 mg once a day.

## 2021-12-13 NOTE — PROGRESS NOTES
Formerly [facility] PHYSICIAN SERVICES  [facility] FAMILY MEDICINE  [address] [address] [address]  Dept: [phone]  Dept Fax: [phone]  Loc: [phone]    Bailey Guerrero is a 49 y.o. male who presents today for his medical conditions/complaints as noted below. Bailey Guerrero is here for Follow-up        HPI:   CC: Here today to discuss the following:    Depression with Anxiety  Compliant with medications. No adverse effects from medication. Depression and anxiety symptoms are stable today. No suicidal or homicidal ideation. Excessive worry, insomnia, and anxiousness are stable. Energy, concentration, and apathy are stable. Hyperlipidemia  Tolerating current cholesterol medication without side effects. No body aches. Attempting to reduce processed sugar and cholesterol from diet. DENZEL on CPAP  He feels increased daytime somnolence and is concerned that his CPAP settings may be inappropriate. States been on the same settings for over 5 years now. Currently gets his supplies from [employer]. Gastroesophageal Reflux Disease  Symptoms currently under control. Medication adequately controls his symptoms. No hematochezia or melena. Hypertension  Compliant with medications. No adverse effects from medication. No lightheadedness, palpitations, or chest pain. Hypogonadism  Tolerating his current testosterone replacement therapy without adverse effects. He has noticed no symptoms of daytime somnolence consistent with sleep apnea. He has had no urinary symptoms. No mood disturbance.  Overall satisfied with his current treatment          HPI    Past Medical History:   Diagnosis Date    Hyperlipidemia     Hypertension     Kidney stone       Past Surgical History:   Procedure Laterality Date    CARPAL TUNNEL RELEASE      Right    LITHOTRIPSY         Family History   Problem Relation Age of Onset    Diabetes Mother     High Blood Pressure Mother     Diabetes Father    [employer] Heart Disease Father     High Cholesterol Father        Social History     Tobacco Use    Smoking status: Never Smoker    Smokeless tobacco: Never Used   Substance Use Topics    Alcohol use: No     Current Outpatient Medications   Medication Sig Dispense Refill    atorvastatin (LIPITOR) 40 MG tablet Take 1 tablet by mouth daily 90 tablet 1    SYRINGE-NEEDLE, DISP, 3 ML (SAFETY SYRINGE/NEEDLE) 22G X 1-1/2\" 3 ML MISC Use one needle to administer testosterone every 2 weeks 12 each 3    SYRINGE-NEEDLE, DISP, 3 ML 18G X 1-1/2\" 3 ML MISC Use one syringe to draw testosterone every 2 weeks 12 each 3    testosterone cypionate (DEPOTESTOTERONE CYPIONATE) 200 MG/ML injection INJECT 1.5 ML IM EVERY 14 DAYS 10 mL 1    buPROPion (WELLBUTRIN XL) 150 MG extended release tablet TAKE 1 TABLET BY MOUTH EVERY DAY IN THE MORNING 90 tablet 3    lamoTRIgine (LAMICTAL) 100 MG tablet TAKE 1 TABLET BY MOUTH EVERY DAY 30 tablet 3    hydroCHLOROthiazide (HYDRODIURIL) 25 MG tablet TAKE 1 TABLET BY MOUTH EVERY MORNING 30 tablet 5    ramipril (ALTACE) 10 MG capsule TAKE 2 CAPSULES BY MOUTH EVERY DAY 60 capsule 5    methylPREDNISolone (MEDROL DOSEPACK) 4 MG tablet Take by mouth. 1 kit 0    CPAP Machine MISC CPAP and supplies 1 each 0    omeprazole (PRILOSEC) 20 MG delayed release capsule Take 1 capsule by mouth Daily 30 capsule 5     No current facility-administered medications for this visit.      Allergies   Allergen Reactions    Azithromycin     Influenza Virus Vaccine     Penicillins        Health Maintenance   Topic Date Due    COVID-19 Vaccine (1) Never done    HIV screen  Never done    Colon cancer screen colonoscopy  Never done    Shingles Vaccine (1 of 2) Never done    Lipid screen  12/06/2022    Potassium monitoring  12/06/2022    Creatinine monitoring  12/06/2022    DTaP/Tdap/Td vaccine (2 - Td or Tdap) 12/20/2023    Hepatitis C screen  Completed    Hepatitis A vaccine  Aged Out    Hepatitis B vaccine  Aged Out    Hib vaccine  Aged Out    Meningococcal (ACWY) vaccine  Aged Out    Pneumococcal 0-64 years Vaccine  Aged Out       Subjective:      Review of Systems  Review of Systems   Constitutional: Negative for chills and fever. HENT: Negative for congestion. Respiratory: Negative for cough, chest tightness and shortness of breath. Cardiovascular: Negative for chest pain, palpitations and leg swelling. Gastrointestinal: Negative for abdominal pain, anal bleeding, constipation, diarrhea and nausea. Genitourinary: Negative for difficulty urinating. Psychiatric/Behavioral: Negative. SeeHPI for visit specific review of symptoms. All others negative      Objective:   /86   Pulse 86   Temp 98.6 °F (37 °C) (Temporal)   Ht 5' 8\" (1.727 m)   Wt 225 lb (102.1 kg)   SpO2 99%   BMI 34.21 kg/m²   Physical Exam  Physical Exam   Constitutional: He appears well-developed. Does not appear ill. Neck: Normal range of motion. Neck supple. No masses. Neck Symmetric. Normal tracheal position. No thyroid enlargement  Cardiovascular: Normal rate and regular rhythm. Exam reveals no friction rub. Carotid arteries: no bruit observed. No murmur heard. Respiratory:  Effort normal and breath sounds normal. No respiratory distress. No wheezes. No rales. No use of accessory muscles or intercostal retractions. Neurological: alert. Psychiatric: normal mood and affect. His behavior is normal. Normal judgement and insight observed.       Recent Results (from the past 672 hour(s))   Hepatitis C Antibody    Collection Time: 12/06/21  8:34 AM   Result Value Ref Range    Hep C Ab Interp Non-Reactive    CBC Auto Differential    Collection Time: 12/06/21  8:34 AM   Result Value Ref Range    WBC 7.9 4.8 - 10.8 K/uL    RBC 5.38 4.70 - 6.10 M/uL    Hemoglobin 16.9 14.0 - 18.0 g/dL    Hematocrit 51.3 42.0 - 52.0 %    MCV 95.4 (H) 80.0 - 94.0 fL    MCH 31.4 (H) 27.0 - 31.0 pg    MCHC 32.9 (L) 33.0 - 37.0 g/dL    RDW 12.6 11.5 - 14.5 %    Platelets 309 862 - 729 K/uL    MPV 9.6 9.4 - 12.4 fL    Neutrophils % 65.1 (H) 50.0 - 65.0 %    Lymphocytes % 23.0 20.0 - 40.0 %    Monocytes % 7.5 0.0 - 10.0 %    Eosinophils % 3.4 0.0 - 5.0 %    Basophils % 0.6 0.0 - 1.0 %    Neutrophils Absolute 5.1 1.5 - 7.5 K/uL    Immature Granulocytes # 0.0 K/uL    Lymphocytes Absolute 1.8 1.1 - 4.5 K/uL    Monocytes Absolute 0.60 0.00 - 0.90 K/uL    Eosinophils Absolute 0.30 0.00 - 0.60 K/uL    Basophils Absolute 0.10 0.00 - 0.20 K/uL   Lipid Panel    Collection Time: 12/06/21  8:34 AM   Result Value Ref Range    Cholesterol, Total 230 (H) 160 - 199 mg/dL    Triglycerides 254 (H) 0 - 149 mg/dL    HDL 39 (L) 55 - 121 mg/dL    LDL Calculated 140 <100 mg/dL   Testosterone    Collection Time: 12/06/21  8:34 AM   Result Value Ref Range    Testosterone 676.7 193.0 - 740.0 ng/dL   Comprehensive Metabolic Panel    Collection Time: 12/06/21  8:34 AM   Result Value Ref Range    Sodium 138 136 - 145 mmol/L    Potassium 4.0 3.5 - 5.0 mmol/L    Chloride 97 (L) 98 - 111 mmol/L    CO2 28 22 - 29 mmol/L    Anion Gap 13 7 - 19 mmol/L    Glucose 100 74 - 109 mg/dL    BUN 17 6 - 20 mg/dL    CREATININE 1.3 (H) 0.5 - 1.2 mg/dL    GFR Non-African American 58 (A) >60    GFR African American >59 >59    Calcium 9.6 8.6 - 10.0 mg/dL    Total Protein 7.2 6.6 - 8.7 g/dL    Albumin 4.7 3.5 - 5.2 g/dL    Total Bilirubin 0.6 0.2 - 1.2 mg/dL    Alkaline Phosphatase 57 40 - 130 U/L    ALT 42 (H) 5 - 41 U/L    AST 23 5 - 40 U/L   PSA screening    Collection Time: 12/06/21  8:34 AM   Result Value Ref Range    PSA 0.78 0.00 - 4.00 ng/mL               Assessment & Plan: The following diagnoses and conditions are stable with no further orders unless indicated:  1. Depression with anxiety  Wellbutrin  Lamictal  Stable    2. DENZEL on CPAP  At the end of the visit he stated he was concerned that his settings may be inappropriate.   States he continues to have hypersomnia and daytime somnolence during the day. He does not feel as rested in the morning. He states he is compliant with his machine. He currently gets his supplies from [employer]. He is asking if a repeat sleep study would be beneficial    3. Gastroesophageal reflux disease without esophagitis  Omeprazole  Stable  4. Essential (primary) hypertension  BP Readings from Last 3 Encounters:   12/13/21 136/86   11/22/21 (!) 133/92   06/10/21 134/86     Ramipril 20 mg daily  Hydrochlorothiazide 25 mg daily  Borderline today  Discussed lifestyle changes such as diet and exercise. Recommended eliminate processed food from diet such as sugar and fried foods. Recommended exercising at least 150 minutes/week. Try to do full body resistance training twice a week as well. Offered suggestions for calorie counting such as phone apps and online resources such as [employer] fitness pal and Lose it. Discussed healthy weight. 5. Hypercholesterolemia  Lab Results   Component Value Date    CHOL 230 (H) 12/06/2021    CHOL 214 (H) 02/18/2021    CHOL 175 06/09/2020     Lab Results   Component Value Date    TRIG 254 (H) 12/06/2021    TRIG 265 (H) 02/18/2021    TRIG 272 (H) 06/09/2020     Lab Results   Component Value Date    HDL 39 (L) 12/06/2021    HDL 42 (L) 02/18/2021    HDL 35 (L) 06/09/2020     Lab Results   Component Value Date    LDLCALC 140 12/06/2021    LDLCALC 119 02/18/2021    LDLCALC 86 06/09/2020     No results found for: LABVLDL, VLDL  No results found for: CHOLHDLRATIO  Switch to atovastatin 40 mg  Discontinue zocor  Check lfts and lipid panel in 4 weeks    6.  Hypogonadism in male  Lab Results   Component Value Date    TESTOSTERONE 676.7 12/06/2021     Lab Results   Component Value Date    WBC 7.9 12/06/2021    HGB 16.9 12/06/2021    HCT 51.3 12/06/2021    MCV 95.4 (H) 12/06/2021     12/06/2021     Lab Results   Component Value Date    PSA 0.78 12/06/2021    PSA 0.96 06/09/2020    PSA 0.95 07/11/2019     Stable continue with current dose testosterone placement          Return in about 6 months (around 6/13/2022) for Routine follow up - 15 minute visit. Discussed use, benefit, and side effects of prescribed medications. All patient questions answered. Pt voiced understanding. Reviewed health maintenance. Instructedto continue current medications, diet and exercise. Patient agreed with treatmentplan. Follow up as directed. Note dictated using Dragon Dictation software  Sometimes this dictation software makes erroneous transcriptions.

## 2022-01-25 DIAGNOSIS — F41.8 DEPRESSION WITH ANXIETY: ICD-10-CM

## 2022-01-25 RX ORDER — BUPROPION HYDROCHLORIDE 150 MG/1
TABLET ORAL
Qty: 90 TABLET | Refills: 3 | Status: SHIPPED | OUTPATIENT
Start: 2022-01-25

## 2022-01-25 NOTE — TELEPHONE ENCOUNTER
Brennen Gonzalez called to request a refill on his medication.       Last office visit : 12/13/2021   Next office visit : 6/14/2022     Requested Prescriptions     Signed Prescriptions Disp Refills    buPROPion (WELLBUTRIN XL) 150 MG extended release tablet 90 tablet 3     Sig: TAKE 1 TABLET BY MOUTH EVERY DAY IN THE MORNING     Authorizing Provider: Steffanie Chavez     Ordering User: Jono Booth

## 2022-01-26 ENCOUNTER — HOSPITAL ENCOUNTER (OUTPATIENT)
Dept: SLEEP CENTER | Age: 52
Discharge: HOME | End: 2022-01-28
Payer: COMMERCIAL

## 2022-01-26 PROCEDURE — 95811 POLYSOM 6/>YRS CPAP 4/> PARM: CPT

## 2022-01-27 NOTE — PROGRESS NOTES
[facility] [address] [place] [address]  Phone [phone] Fax [phone]     Sleep Study Technician Review    Patient Name:  Annette Martínez  :   1970  Referring Provider: Cesar Woody MD    Brief History:  Annette Martínez is a 49 y.o.   with a history of     Depression with Anxiety  Compliant with medications. No adverse effects from medication. Depression and anxiety symptoms are stable today. No suicidal or homicidal ideation. Excessive worry, insomnia, and anxiousness are stable. Energy, concentration, and apathy are stable. Hyperlipidemia  Tolerating current cholesterol medication without side effects. No body aches. Attempting to reduce processed sugar and cholesterol from diet. DENZEL on CPAP  He feels increased daytime somnolence and is concerned that his CPAP settings may be inappropriate. States been on the same settings for over 5 years now. Currently gets his supplies from [employer]. Gastroesophageal Reflux Disease  Symptoms currently under control. Medication adequately controls his symptoms. No hematochezia or melena. Hypertension  Compliant with medications. No adverse effects from medication. No lightheadedness, palpitations, or chest pain. Height: 68 inches  Weight: 225 lbs  BMI: 24.12  Neck Circ:17.5 inches  MALLAMPATI:4  ESS: 11    Type of Study: Titration sleep study  Time Stage Position Snore Hypopnea Obs Apnea Melisa Apnea PAP O2   2200 2 Left side no yes no no +6 RA   2300 2 supine no yes no no +8 RA   2400 2 supine no yes no no +9 RA   0100 REM supine no yes no no +12 RA   0200 2 supine no yes no no +13 RA   0300 2 supine no no no no +13 RA   0400 2 Right side no no no no +13 RA     Summary: Patient tolerated mask and pressure well, with no complaints.       DME: [facility]   Final PAP settings: +13 cmh2o  Mask Type: Nasal pillow  Mask: P10 Resmed   Mask Size: Medium    The study was reviewed briefly with Abdoulaye Reyes. He will be notified of the formal results and recommendations after the study is scored and interpreted. The report will be sent to his referring provider.     Technician: Juli Loredo RRT, RPSGT

## 2022-01-28 NOTE — PROGRESS NOTES
[place] [address]  [place], [address]  Phone [phone] Fax [phone]    Polysomnography Report  Patient Name Quinton Garnett Account Number [de-identified]    1970 Referring Provider Bernardino Vargas M.D.   Age/ Gender 51 years/M Interpreting physician Pepito Ku M.D., [facility]   Neck circumference/  Mallampati classification 17.5 in/class 4 Night Technician Anahi Rowe RRT, RPSGT   Norman score  Scoring Technician Susan Woodruff, CRT, RPSGT   Height 68.0 in Indications for the test Obstructive Sleep Apnea   Weight 225.0 lbs Test Titration Polysomnogram   BMI 34.2 Date of test 2022     Procedure  A Titration Polysomnogram was conducted on the night of 2022. The study was performed and scored per AASM guidelines. The following were monitored: frontal, central, and occipital EEG, electrooculogram (EOG), submentalis EMG, nasal and oral airflow, intranasal pressure, thoracic plethysmography, abdominal plethysmography, anterior tibialis EMG, electrocardiogram, body position, and positive airway pressure (PAP). Arterial oxygen saturation was monitored with a pulse oximeter. The study was scored utilizing 30 second epochs. Hypopneas were scored using per AASM definition VIII, D, 1B.     Sleep Scoring Data  Lights out 8:32:29 PM Sleep latency 0.9 min Time in N1 80.5 min N1% 21.5%   Lights on 3:30:47 AM WASO 42.4 min Time in N2 245.5 min N2% 65.5%   TIB/.3 min Sleep efficiency 90.3% Time in N3 0.0 min N3% 0.0%   .0 min REM latency 171.0 min Time in R 49.0 min R% 13.1%     Respiratory Events Summary   NREM REM Total   Hypopnea index 2.9 2.4 3.0   Apnea index 0.0 0.0 0.0   RERA index 0.0 0.0 0.0   AHI 2.9 2.4 3.0   RDI (AHI + RERA index) 2.9 2.4 3.0     Respiratory Events by Sleep Stage   Obstructive Apneas OA Index Central Apneas CA Index Mixed Apneas MA Index   Hypopneas H Index   RERAs   R Index   NREM 0 0.0 0 0.0 0 0.0 16 2.9 0 0.0   REM 0 0.0 0 0.0 0 0.0 2 2.4 0 0.0   Total 0 0.0 0 0.0 0 0.0 19 3.0 0 0.0     Respiratory Events by Body Position   Time (min) Obstructive Apneas OA Index Central Apneas CA Index Mixed Apneas MA Index   Hypopneas H Index   RERAs RERA  Index Total  AHI Total RDI   Supine 217.6  0 0.0 0 0.0 0 0.0 15 4.3 0 0.0 4.3 4.3   R/Supine                                           L/Supine                                           Right 57.2  0 0.0 0 0.0 0 0.0 0 0.0 0 0.0 0.0 0.0   Left 140.7  0 0.0 0 0.0 0 0.0 4 2.0 0 0.0 2.0 2.0   Prone                                           R/Prone                                           L/Prone                                           Up                                             Snoring  Snoring Rating (loudness 0-4) 1   Snoring Amount (% of total sleep time with snoring) 0%     Oximetry Data              Wake NREM REM TST   Average Saturation  96% 96% 96% 96%   Desaturation Index (#/hour)  0.7 1.2 0.8   Desaturation Max Duration (sec)  44.0 32.0 44.0   Minimum O2 Saturation    89%     Oximetry Distribution              WaKe REM NREM TOTAL %TIB   <90% (min) 0.0 0.1 0.0 0.1 0.0%   <85% (min) 0.0 0.0 0.0 0.0 0.0%   <80% (min) 0.0 0.0 0.0 0.0 0.0%   <75% (min) 0.0 0.0 0.0 0.0 0.0%   <70% (min) 0.0 0.0 0.0 0.0 0.0%   <60% (min) 0.0 0.0 0.0 0.0 0.0%   <50% (min) 0.0 0.0 0.0 0.0 0.0%   Fail    (min) 2.5 0.0 0.5 3.0      Respiratory Pattern   Present Absent Duration   Hypoventilation  ? N/A   Cheyne Sandra Respirations  ? N/A   Periodic Breathing  ? N/A     Heart Rate   Mean heart rate (bmp) Maximum heart rate (bmp)   During recording       107   During sleep 70.6 101     Heart Rhythm Summary  Normal sinus rhythm     Heart Rhythm Events  Type of events Present Absent Rate-Duration/Total Duration   Bradycardia        ? N/A   Asystole    ? N/A   Sinus Tachycardia During Sleep  ? N/A   Narrow Complex Tachycardia  ? N/A     Wide Complex Tachycardia  ?  N/A   Atrial Fibrillation  ? N/A   Other Arrhythmias  ? N/A     Arousals   NREM REM Total Arousal Index   Spontaneous 25 2 30 4.8   Respiratory 15 3 19 3.0   Snore 1 0 1 0.2   Leg movement 88 4 95 15.2   Total 129 9 148 23.7     Periodic Limb Movement Data (legs unless otherwise noted)   Leg Movements PLMS PLMS with arousal   # of events 109 106 93   Index (#/hr TST) 17.4 17.0 14.9     Therapy Titration  CPAP TIB Sleep REM Apneas Hypopneas RERAs   Min    (min) (min) (min) CA# OA# MA# Index # Index # Index AHI RDI SpO2    6 136.0 116.0 0.0 0 0 0 0.0 4 2.1 0 0.0 2.1 2.1 94    8 26.4 18.9 0.0 0 0 0 0.0 3 9.5 0 0.0 9.5 9.5 94    9 69.0 67.0 15.0 0 0 0 0.0 2 1.8 0 0.0 1.8 1.8 91    10 22.3 22.3 0.0 0 0 0 0.0 6 16.1 0 0.0 16.1 16.1 92    11 4.2 4.2 0.6 0 0 0 0.0 2 28.6 0 0.0 28.6 28.6 89    12 11.6 11.6 11.6 0 0 0 0.0 2 10.3 0 0.0 10.3 10.3 93     13 144.2 133.2 21.2 0 0 0 0.0 0 0.0 0 0.0 0.0 0.0 93      Note:   The interpreting physician named above, reviewed this record in its entirety, including sleep staging, EMG activity, EEG, EKG, breathing parameters, oxygen saturation, body position, and behavior unless otherwise noted. The interpretation is based on this information in addition to available clinical history and physical examination data. Interpretation:     1. Obstructive sleep apnea. 2. Obesity. 3. Subjective hypersomnia. 4. Adequate PAP titration. Recommendations:    1. CPAP at 13 cm water presser. 2. Weight loss and exercise. 3. Avoid risky activity such as driving if sleepy. 4. Discuss sleep hygiene with the patient. 5. Monitor PAP use and effectiveness.        Eleno Fraser MD, [facility], [facility]

## 2022-01-30 DIAGNOSIS — G47.33 OSA (OBSTRUCTIVE SLEEP APNEA): Primary | ICD-10-CM

## 2022-02-09 ENCOUNTER — OFFICE VISIT (OUTPATIENT)
Age: 52
End: 2022-02-09
Payer: COMMERCIAL

## 2022-02-09 VITALS
HEIGHT: 68 IN | WEIGHT: 226 LBS | SYSTOLIC BLOOD PRESSURE: 146 MMHG | BODY MASS INDEX: 34.25 KG/M2 | DIASTOLIC BLOOD PRESSURE: 92 MMHG | TEMPERATURE: 98.2 F | RESPIRATION RATE: 20 BRPM | HEART RATE: 88 BPM | OXYGEN SATURATION: 97 %

## 2022-02-09 DIAGNOSIS — J01.90 ACUTE SINUSITIS, RECURRENCE NOT SPECIFIED, UNSPECIFIED LOCATION: Primary | ICD-10-CM

## 2022-02-09 PROCEDURE — 99213 OFFICE O/P EST LOW 20 MIN: CPT | Performed by: NURSE PRACTITIONER

## 2022-02-09 RX ORDER — DOXYCYCLINE 100 MG/1
100 TABLET ORAL 2 TIMES DAILY
Qty: 20 TABLET | Refills: 0 | Status: SHIPPED | OUTPATIENT
Start: 2022-02-09 | End: 2022-02-19

## 2022-02-09 RX ORDER — METHYLPREDNISOLONE 4 MG/1
TABLET ORAL
Qty: 1 KIT | Refills: 0 | Status: SHIPPED | OUTPATIENT
Start: 2022-02-09 | End: 2022-04-13 | Stop reason: ALTCHOICE

## 2022-02-09 ASSESSMENT — ENCOUNTER SYMPTOMS
COUGH: 1
SINUS COMPLAINT: 1
SINUS PRESSURE: 1

## 2022-02-09 NOTE — PROGRESS NOTES
[address] [facility] URGENT CRE  [address] [address] [place] [address]  Dept: [phone]  Dept Fax: [phone]  Loc: [phone]    Rene Barthel is a 49 y.o. male who presents today for his medical conditions/complaintsas noted below. Rene Barthel is c/o of Sinus Problem (for 4 days)        HPI:     Sinus Problem  This is a new problem. Episode onset: 4 days. The problem has been rapidly worsening since onset. There has been no fever. The pain is moderate. Associated symptoms include congestion, coughing, ear pain, headaches and sinus pressure. Pertinent negatives include no chills. Treatments tried: otc  The treatment provided no relief. Past Medical History:   Diagnosis Date    Hyperlipidemia     Hypertension     Kidney stone      Past Surgical History:   Procedure Laterality Date    CARPAL TUNNEL RELEASE      Right    LITHOTRIPSY         Family History   Problem Relation Age of Onset    Diabetes Mother     High Blood Pressure Mother     Diabetes Father     Heart Disease Father     High Cholesterol Father        Social History     Tobacco Use    Smoking status: Never Smoker    Smokeless tobacco: Never Used   Substance Use Topics    Alcohol use: No      Current Outpatient Medications   Medication Sig Dispense Refill    doxycycline monohydrate (ADOXA) 100 MG tablet Take 1 tablet by mouth 2 times daily for 10 days 20 tablet 0    methylPREDNISolone (MEDROL DOSEPACK) 4 MG tablet Take by mouth.  1 kit 0    buPROPion (WELLBUTRIN XL) 150 MG extended release tablet TAKE 1 TABLET BY MOUTH EVERY DAY IN THE MORNING 90 tablet 3    atorvastatin (LIPITOR) 40 MG tablet Take 1 tablet by mouth daily 90 tablet 1    SYRINGE-NEEDLE, DISP, 3 ML (SAFETY SYRINGE/NEEDLE) 22G X 1-1/2\" 3 ML MISC Use one needle to administer testosterone every 2 weeks 12 each 3    SYRINGE-NEEDLE, DISP, 3 ML 18G X 1-1/2\" 3 ML MISC Use one syringe to draw testosterone every 2 weeks 12 each 3    testosterone cypionate (DEPOTESTOTERONE CYPIONATE) 200 MG/ML injection INJECT 1.5 ML IM EVERY 14 DAYS 10 mL 1    lamoTRIgine (LAMICTAL) 100 MG tablet TAKE 1 TABLET BY MOUTH EVERY DAY 30 tablet 3    hydroCHLOROthiazide (HYDRODIURIL) 25 MG tablet TAKE 1 TABLET BY MOUTH EVERY MORNING 30 tablet 5    ramipril (ALTACE) 10 MG capsule TAKE 2 CAPSULES BY MOUTH EVERY DAY 60 capsule 5    CPAP Machine MISC CPAP and supplies 1 each 0    omeprazole (PRILOSEC) 20 MG delayed release capsule Take 1 capsule by mouth Daily 30 capsule 5     No current facility-administered medications for this visit. Allergies   Allergen Reactions    Azithromycin     Influenza Virus Vaccine     Penicillins        Health Maintenance   Topic Date Due    COVID-19 Vaccine (1) Never done    HIV screen  Never done    Colon cancer screen colonoscopy  Never done    Shingles Vaccine (1 of 2) Never done    Depression Monitoring  02/22/2022    Lipid screen  12/06/2022    Potassium monitoring  12/06/2022    Creatinine monitoring  12/06/2022    DTaP/Tdap/Td vaccine (2 - Td or Tdap) 12/20/2023    Hepatitis C screen  Completed    Hepatitis A vaccine  Aged Out    Hepatitis B vaccine  Aged Out    Hib vaccine  Aged Out    Meningococcal (ACWY) vaccine  Aged Out    Pneumococcal 0-64 years Vaccine  Aged Out       Subjective:     Review of Systems   Constitutional: Negative for chills and fever. HENT: Positive for congestion, ear pain and sinus pressure. Respiratory: Positive for cough. Skin: Negative for rash. Neurological: Positive for headaches. All other systems reviewed and are negative.      :Objective      Physical Exam  Vitals and nursing note reviewed. Constitutional:       General: He is not in acute distress. Appearance: Normal appearance. He is well-developed. He is not ill-appearing or diaphoretic. HENT:      Head: Normocephalic and atraumatic.       Right Ear: Tympanic membrane, ear canal and external ear normal.      Left Ear: Tympanic membrane, ear canal and external ear normal.      Nose: Congestion present. Mouth/Throat:      Mouth: Mucous membranes are moist.      Pharynx: Oropharynx is clear. Eyes:      Conjunctiva/sclera: Conjunctivae normal.      Pupils: Pupils are equal, round, and reactive to light. Cardiovascular:      Rate and Rhythm: Normal rate and regular rhythm. Heart sounds: Normal heart sounds. No murmur heard. Pulmonary:      Effort: Pulmonary effort is normal. No respiratory distress. Breath sounds: Normal breath sounds. No wheezing. Skin:     General: Skin is warm and dry. Findings: No rash. Neurological:      Mental Status: He is alert and oriented to person, place, and time. Psychiatric:         Mood and Affect: Mood normal.         Behavior: Behavior normal.       BP (!) 146/92   Pulse 88   Temp 98.2 °F (36.8 °C)   Resp 20   Ht 5' 8\" (1.727 m)   Wt 226 lb (102.5 kg)   SpO2 97%   BMI 34.36 kg/m²     :Assessment       Diagnosis Orders   1. Acute sinusitis, recurrence not specified, unspecified location  doxycycline monohydrate (ADOXA) 100 MG tablet    methylPREDNISolone (MEDROL DOSEPACK) 4 MG tablet       :Plan   1. Take full course of antibiotics  2. Take medrol as directed  3. Take tessalon as needed for coughing  4. Monitor fever and treat as needed  5. May use OTC claritin/zyrtec and nasal spray such as flonase to help symptoms  6. If patient is not improving or developing any new/worsening symptoms then return to clinic as needed or go to ER. Patient is to follow up with PCP as needed. No orders of the defined types were placed in this encounter. No follow-ups on file. Orders Placed This Encounter   Medications    doxycycline monohydrate (ADOXA) 100 MG tablet     Sig: Take 1 tablet by mouth 2 times daily for 10 days     Dispense:  20 tablet     Refill:  0    methylPREDNISolone (MEDROL DOSEPACK) 4 MG tablet     Sig: Take by mouth. Dispense:  1 kit     Refill:  0       Patient given educational materials- see patient instructions. Discussed use, benefit, and side effects of prescribedmedications. All patient questions answered. Pt voiced understanding. Patient Instructions       Patient Education        Sinusitis: Care Instructions  Your Care Instructions     Sinusitis is an infection of the lining of the sinus cavities in your head. Sinusitis often follows a cold. It causes pain and pressure in your head and face. In most cases, sinusitis gets better on its own in 1 to 2 weeks. But some mild symptoms may last for several weeks. Sometimes antibiotics are needed. Follow-up care is a key part of your treatment and safety. Be sure to make and go to all appointments, and call your doctor if you are having problems. It's also a good idea to know your test results and keep a list of the medicines you take. How can you care for yourself at home? · Take an over-the-counter pain medicine, such as acetaminophen (Tylenol), ibuprofen (Advil, Motrin), or naproxen (Aleve). Read and follow all instructions on the label. · If the doctor prescribed antibiotics, take them as directed. Do not stop taking them just because you feel better. You need to take the full course of antibiotics. · Be careful when taking over-the-counter cold or flu medicines and Tylenol at the same time. Many of these medicines have acetaminophen, which is Tylenol. Read the labels to make sure that you are not taking more than the recommended dose. Too much acetaminophen (Tylenol) can be harmful. · Breathe warm, moist air from a steamy shower, a hot bath, or a sink filled with hot water. Avoid cold, dry air. Using a humidifier in your home may help. Follow the directions for cleaning the machine. · Use saline (saltwater) nasal washes. This can help keep your nasal passages open and wash out mucus and bacteria.  You can buy saline nose drops at a grocery store or drugstore. Or you can make your own at home by adding 1 teaspoon of salt and 1 teaspoon of baking soda to 2 cups of distilled water. If you make your own, fill a bulb syringe with the solution, insert the tip into your nostril, and squeeze gently. Florene Manger your nose. · Put a hot, wet towel or a warm gel pack on your face 3 or 4 times a day for 5 to 10 minutes each time. · Try a decongestant nasal spray like oxymetazoline (Afrin). Do not use it for more than 3 days in a row. Using it for more than 3 days can make your congestion worse. When should you call for help? Call your doctor now or seek immediate medical care if:    · You have new or worse swelling or redness in your face or around your eyes.     · You have a new or higher fever. Watch closely for changes in your health, and be sure to contact your doctor if:    · You have new or worse facial pain.     · The mucus from your nose becomes thicker (like pus) or has new blood in it.     · You are not getting better as expected. Where can you learn more? Go to https://Zwittle.Azimuth Systems. org and sign in to your [employer] account. Enter T303 in the [employer] box to learn more about \"Sinusitis: Care Instructions. \"     If you do not have an account, please click on the \"Sign Up Now\" link. Current as of: September 8, 2021               Content Version: 13.1  © 2006-2021 Healthwise, Incorporated. Care instructions adapted under license by [facility] ([facility]). If you have questions about a medical condition or this instruction, always ask your healthcare professional. [address] [address] any warranty or liability for your use of this information. 1. Take full course of antibiotics  2. Take medrol as directed  3. Take tessalon as needed for coughing  4. Monitor fever and treat as needed  5. May use OTC claritin/zyrtec and nasal spray such as flonase to help symptoms  6.  If patient is not improving or developing any new/worsening symptoms then return to clinic as needed or go to ER. Patient is to follow up with PCP as needed.              Electronically signed by ELLEN Deluna on 2/9/2022 at 11:58 AM

## 2022-02-09 NOTE — PATIENT INSTRUCTIONS
Patient Education        Sinusitis: Care Instructions  Your Care Instructions     Sinusitis is an infection of the lining of the sinus cavities in your head. Sinusitis often follows a cold. It causes pain and pressure in your head and face. In most cases, sinusitis gets better on its own in 1 to 2 weeks. But some mild symptoms may last for several weeks. Sometimes antibiotics are needed. Follow-up care is a key part of your treatment and safety. Be sure to make and go to all appointments, and call your doctor if you are having problems. It's also a good idea to know your test results and keep a list of the medicines you take. How can you care for yourself at home? · Take an over-the-counter pain medicine, such as acetaminophen (Tylenol), ibuprofen (Advil, Motrin), or naproxen (Aleve). Read and follow all instructions on the label. · If the doctor prescribed antibiotics, take them as directed. Do not stop taking them just because you feel better. You need to take the full course of antibiotics. · Be careful when taking over-the-counter cold or flu medicines and Tylenol at the same time. Many of these medicines have acetaminophen, which is Tylenol. Read the labels to make sure that you are not taking more than the recommended dose. Too much acetaminophen (Tylenol) can be harmful. · Breathe warm, moist air from a steamy shower, a hot bath, or a sink filled with hot water. Avoid cold, dry air. Using a humidifier in your home may help. Follow the directions for cleaning the machine. · Use saline (saltwater) nasal washes. This can help keep your nasal passages open and wash out mucus and bacteria. You can buy saline nose drops at a grocery store or drugstore. Or you can make your own at home by adding 1 teaspoon of salt and 1 teaspoon of baking soda to 2 cups of distilled water. If you make your own, fill a bulb syringe with the solution, insert the tip into your nostril, and squeeze gently.  Mariposa Miranda your nose.  · Put a hot, wet towel or a warm gel pack on your face 3 or 4 times a day for 5 to 10 minutes each time. · Try a decongestant nasal spray like oxymetazoline (Afrin). Do not use it for more than 3 days in a row. Using it for more than 3 days can make your congestion worse. When should you call for help? Call your doctor now or seek immediate medical care if:    · You have new or worse swelling or redness in your face or around your eyes.     · You have a new or higher fever. Watch closely for changes in your health, and be sure to contact your doctor if:    · You have new or worse facial pain.     · The mucus from your nose becomes thicker (like pus) or has new blood in it.     · You are not getting better as expected. Where can you learn more? Go to https://Urban CompasspeInnovative Silicon.Pivotal Systems. org and sign in to your [employer] account. Enter G427 in the [employer] box to learn more about \"Sinusitis: Care Instructions. \"     If you do not have an account, please click on the \"Sign Up Now\" link. Current as of: September 8, 2021               Content Version: 13.1  © 2628-1829 Healthwise, [employer]. Care instructions adapted under license by [facility] ([facility]). If you have questions about a medical condition or this instruction, always ask your healthcare professional. [address] [address] any warranty or liability for your use of this information. 1. Take full course of antibiotics  2. Take medrol as directed  3. Take tessalon as needed for coughing  4. Monitor fever and treat as needed  5. May use OTC claritin/zyrtec and nasal spray such as flonase to help symptoms  6. If patient is not improving or developing any new/worsening symptoms then return to clinic as needed or go to ER. Patient is to follow up with PCP as needed.

## 2022-03-24 ENCOUNTER — OFFICE VISIT (OUTPATIENT)
Dept: FAMILY MEDICINE CLINIC | Age: 52
End: 2022-03-24
Payer: COMMERCIAL

## 2022-03-24 VITALS
HEART RATE: 81 BPM | DIASTOLIC BLOOD PRESSURE: 96 MMHG | BODY MASS INDEX: 34.52 KG/M2 | WEIGHT: 227 LBS | OXYGEN SATURATION: 98 % | SYSTOLIC BLOOD PRESSURE: 142 MMHG | TEMPERATURE: 98.1 F

## 2022-03-24 DIAGNOSIS — J34.89 NASAL LESION: Primary | ICD-10-CM

## 2022-03-24 DIAGNOSIS — J34.89 NASAL LESION: ICD-10-CM

## 2022-03-24 DIAGNOSIS — E29.1 HYPOGONADISM IN MALE: ICD-10-CM

## 2022-03-24 DIAGNOSIS — Z12.11 ENCOUNTER FOR SCREENING COLONOSCOPY: ICD-10-CM

## 2022-03-24 PROCEDURE — 99213 OFFICE O/P EST LOW 20 MIN: CPT | Performed by: FAMILY MEDICINE

## 2022-03-24 RX ORDER — MUPIROCIN CALCIUM 20 MG/G
CREAM TOPICAL
Qty: 30 G | Refills: 1 | Status: SHIPPED | OUTPATIENT
Start: 2022-03-24 | End: 2022-04-23

## 2022-03-24 RX ORDER — TESTOSTERONE CYPIONATE 200 MG/ML
INJECTION INTRAMUSCULAR
Qty: 10 ML | Refills: 1 | Status: SHIPPED | OUTPATIENT
Start: 2022-03-24 | End: 2022-05-12

## 2022-03-24 ASSESSMENT — PATIENT HEALTH QUESTIONNAIRE - PHQ9
SUM OF ALL RESPONSES TO PHQ QUESTIONS 1-9: 0
1. LITTLE INTEREST OR PLEASURE IN DOING THINGS: 0
9. THOUGHTS THAT YOU WOULD BE BETTER OFF DEAD, OR OF HURTING YOURSELF: 0
3. TROUBLE FALLING OR STAYING ASLEEP: 0
5. POOR APPETITE OR OVEREATING: 0
4. FEELING TIRED OR HAVING LITTLE ENERGY: 0
8. MOVING OR SPEAKING SO SLOWLY THAT OTHER PEOPLE COULD HAVE NOTICED. OR THE OPPOSITE, BEING SO FIGETY OR RESTLESS THAT YOU HAVE BEEN MOVING AROUND A LOT MORE THAN USUAL: 0
SUM OF ALL RESPONSES TO PHQ QUESTIONS 1-9: 0
2. FEELING DOWN, DEPRESSED OR HOPELESS: 0
SUM OF ALL RESPONSES TO PHQ9 QUESTIONS 1 & 2: 0
6. FEELING BAD ABOUT YOURSELF - OR THAT YOU ARE A FAILURE OR HAVE LET YOURSELF OR YOUR FAMILY DOWN: 0
SUM OF ALL RESPONSES TO PHQ QUESTIONS 1-9: 0
10. IF YOU CHECKED OFF ANY PROBLEMS, HOW DIFFICULT HAVE THESE PROBLEMS MADE IT FOR YOU TO DO YOUR WORK, TAKE CARE OF THINGS AT HOME, OR GET ALONG WITH OTHER PEOPLE: 0
SUM OF ALL RESPONSES TO PHQ QUESTIONS 1-9: 0
7. TROUBLE CONCENTRATING ON THINGS, SUCH AS READING THE NEWSPAPER OR WATCHING TELEVISION: 0

## 2022-03-24 ASSESSMENT — ENCOUNTER SYMPTOMS
FACIAL SWELLING: 0
SINUS PAIN: 0
SINUS PRESSURE: 0
RHINORRHEA: 0
SORE THROAT: 0
COUGH: 0

## 2022-03-24 NOTE — PROGRESS NOTES
[facility] PHYSICIAN SERVICES  [facility] MED[facility]  [address] [address]  Dept: [phone]  Dept Fax: [phone]  Loc: [phone]    Cassie Martinez is a 49 y.o. male who presents today for his medical conditions/complaints as noted below. Cassie Martinez is here for Nose Problem        HPI:   CC: Here today to discuss the following:    States he had a sinus infection a few weeks ago but is had a persistent painful sore in the right naris. Is had occasional epistaxis as well. Pain is described as mild. No fever chills. HPI    Subjective:      Review of Systems   Constitutional: Negative for chills and fever. HENT: Positive for nosebleeds. Negative for congestion, dental problem, drooling, ear discharge, ear pain, facial swelling, hearing loss, mouth sores, postnasal drip, rhinorrhea, sinus pressure, sinus pain, sneezing and sore throat. Respiratory: Negative for cough. SeeHPI for visit specific review of symptoms. All others negative      Objective:   BP (!) 142/96   Pulse 81   Temp 98.1 °F (36.7 °C)   Wt 227 lb (103 kg)   SpO2 98%   BMI 34.52 kg/m²   Physical Exam  General: Alert awake and oriented  Nose: Erythema and small ulceration septum right nares      No results found for this or any previous visit (from the past 672 hour(s)). Assessment & Plan: The following diagnoses and conditions are stable with no further orders unless indicated:  1. Hypogonadism in male  Is also requesting a refill of his testosterone and supplies. - testosterone cypionate (DEPOTESTOTERONE CYPIONATE) 200 MG/ML injection; INJECT 1.5 ML IM EVERY 14 DAYS  Dispense: 10 mL; Refill: 1  - SYRINGE-NEEDLE, DISP, 3 ML (SAFETY SYRINGE/NEEDLE) 22G X 1-1/2\" 3 ML MISC; Use one needle to administer testosterone every 2 weeks  Dispense: 12 each;  Refill: 3  - SYRINGE-NEEDLE, DISP, 3 ML 18G X 1-1/2\" 3 ML MISC; Use one syringe to draw testosterone every 2 weeks Dispense: 12 each; Refill: 3  We will need his laboratory work done before next visit. 2. Nasal lesion    - mupirocin (BACTROBAN) 2 % cream; Apply topically 3 times daily. Dispense: 30 g; Refill: 1  - Culture, Nasal; Future    3. Encounter for screening colonoscopy    - Diana Bray MD, Gastroenterology, [place]            No follow-ups on file. Discussed use, benefit, and side effects of prescribed medications. All patient questions answered. Pt voiced understanding. Reviewed health maintenance. Instructedto continue current medications, diet and exercise. Patient agreed with treatmentplan. Follow up as directed.     _______________________________________________________________      Past Medical History:   Diagnosis Date    Hyperlipidemia     Hypertension     Kidney stone       Past Surgical History:   Procedure Laterality Date    CARPAL TUNNEL RELEASE      Right    LITHOTRIPSY         Family History   Problem Relation Age of Onset    Diabetes Mother     High Blood Pressure Mother     Diabetes Father     Heart Disease Father     High Cholesterol Father        Social History     Tobacco Use    Smoking status: Never Smoker    Smokeless tobacco: Never Used   Substance Use Topics    Alcohol use: No     Current Outpatient Medications   Medication Sig Dispense Refill    testosterone cypionate (DEPOTESTOTERONE CYPIONATE) 200 MG/ML injection INJECT 1.5 ML IM EVERY 14 DAYS 10 mL 1    SYRINGE-NEEDLE, DISP, 3 ML (SAFETY SYRINGE/NEEDLE) 22G X 1-1/2\" 3 ML MISC Use one needle to administer testosterone every 2 weeks 12 each 3    SYRINGE-NEEDLE, DISP, 3 ML 18G X 1-1/2\" 3 ML MISC Use one syringe to draw testosterone every 2 weeks 12 each 3    mupirocin (BACTROBAN) 2 % cream Apply topically 3 times daily.  30 g 1    buPROPion (WELLBUTRIN XL) 150 MG extended release tablet TAKE 1 TABLET BY MOUTH EVERY DAY IN THE MORNING 90 tablet 3    atorvastatin (LIPITOR) 40 MG tablet Take 1 tablet by mouth daily 90 tablet 1    lamoTRIgine (LAMICTAL) 100 MG tablet TAKE 1 TABLET BY MOUTH EVERY DAY 30 tablet 3    hydroCHLOROthiazide (HYDRODIURIL) 25 MG tablet TAKE 1 TABLET BY MOUTH EVERY MORNING 30 tablet 5    ramipril (ALTACE) 10 MG capsule TAKE 2 CAPSULES BY MOUTH EVERY DAY 60 capsule 5    CPAP Machine MISC CPAP and supplies 1 each 0    omeprazole (PRILOSEC) 20 MG delayed release capsule Take 1 capsule by mouth Daily 30 capsule 5    methylPREDNISolone (MEDROL DOSEPACK) 4 MG tablet Take by mouth. 1 kit 0     No current facility-administered medications for this visit. Allergies   Allergen Reactions    Azithromycin     Influenza Virus Vaccine     Penicillins        Health Maintenance   Topic Date Due    COVID-19 Vaccine (1) Never done    HIV screen  Never done    Colorectal Cancer Screen  Never done    Shingles Vaccine (1 of 2) Never done    Lipid screen  12/06/2022    Potassium monitoring  12/06/2022    Creatinine monitoring  12/06/2022    Depression Monitoring  03/24/2023    DTaP/Tdap/Td vaccine (2 - Td or Tdap) 12/20/2023    Hepatitis C screen  Completed    Hepatitis A vaccine  Aged Out    Hepatitis B vaccine  Aged Out    Hib vaccine  Aged Out    Meningococcal (ACWY) vaccine  Aged Out    Pneumococcal 0-64 years Vaccine  Aged Out       _______________________________________________________________    Note dictated using [id number] [facility]  Sometimes this dictation software makes erroneous transcriptions.

## 2022-03-25 DIAGNOSIS — J34.89 NASAL LESION: Primary | ICD-10-CM

## 2022-03-26 LAB
CULTURE NOSE: ABNORMAL
CULTURE NOSE: ABNORMAL
ORGANISM: ABNORMAL

## 2022-04-01 RX ORDER — LAMOTRIGINE 100 MG/1
TABLET ORAL
Qty: 30 TABLET | Refills: 3 | Status: SHIPPED | OUTPATIENT
Start: 2022-04-01 | End: 2022-08-03

## 2022-04-06 ENCOUNTER — TELEPHONE (OUTPATIENT)
Dept: FAMILY MEDICINE CLINIC | Age: 52
End: 2022-04-06

## 2022-04-06 NOTE — TELEPHONE ENCOUNTER
Spoke to patient and he said that Dr. Daisy Shukla had told him if this is not resolved in 7-10 days he would refer to ENT. Please advise?

## 2022-04-06 NOTE — TELEPHONE ENCOUNTER
That's ok.   He can most likely get in with Dr Beronica Cornell at [facility] soon

## 2022-04-06 NOTE — TELEPHONE ENCOUNTER
I have not seen this and the note from the 24th has no further direction. As long as he can wait, I would offer appt Friday?

## 2022-04-06 NOTE — TELEPHONE ENCOUNTER
----- Message from Ignacio eClaya sent at 4/6/2022  9:23 AM CDT -----  Subject: Message to Provider    QUESTIONS  Information for Provider? Patient stated the area on his nose has not   healed and has gotten bigger. Would like to talk to a nurse. Was seen 3/24   for this.  ---------------------------------------------------------------------------  --------------  CALL BACK INFO  What is the best way for the office to contact you? OK to leave message on   voicemail  Preferred Call Back Phone Number? [phone]  ---------------------------------------------------------------------------  --------------  SCRIPT ANSWERS  Relationship to Patient?  Self

## 2022-04-07 DIAGNOSIS — J34.89 NOSE PAIN: Primary | ICD-10-CM

## 2022-04-13 ENCOUNTER — OFFICE VISIT (OUTPATIENT)
Dept: ENT CLINIC | Age: 52
End: 2022-04-13
Payer: COMMERCIAL

## 2022-04-13 VITALS
DIASTOLIC BLOOD PRESSURE: 80 MMHG | SYSTOLIC BLOOD PRESSURE: 152 MMHG | WEIGHT: 225 LBS | BODY MASS INDEX: 34.1 KG/M2 | HEIGHT: 68 IN

## 2022-04-13 DIAGNOSIS — J34.89 INTERNAL NASAL LESION: Primary | ICD-10-CM

## 2022-04-13 DIAGNOSIS — G47.30 SLEEP APNEA IN ADULT: ICD-10-CM

## 2022-04-13 DIAGNOSIS — J01.01 ACUTE RECURRENT MAXILLARY SINUSITIS: ICD-10-CM

## 2022-04-13 DIAGNOSIS — Z91.09 ENVIRONMENTAL ALLERGIES: ICD-10-CM

## 2022-04-13 PROCEDURE — 99204 OFFICE O/P NEW MOD 45 MIN: CPT | Performed by: OTOLARYNGOLOGY

## 2022-04-13 RX ORDER — CETIRIZINE HYDROCHLORIDE 10 MG/1
10 TABLET ORAL DAILY
COMMUNITY

## 2022-04-13 ASSESSMENT — ENCOUNTER SYMPTOMS
EYES NEGATIVE: 1
RESPIRATORY NEGATIVE: 1
ALLERGIC/IMMUNOLOGIC NEGATIVE: 1
SINUS PRESSURE: 1
GASTROINTESTINAL NEGATIVE: 1
SINUS PAIN: 1

## 2022-04-13 NOTE — PROGRESS NOTES
2022    Jean-Paul Juarez (:  1970) is a 47 y.o. male, Established patient, here for evaluation of the following chief complaint(s):  New Patient (chronic sinus inf and nasal lesion )      Vitals:    22 0801 22 0813   BP: (!) 178/92 (!) 152/80   Weight: 225 lb (102.1 kg)    Height: 5' 8\" (1.727 m)        Wt Readings from Last 3 Encounters:   22 225 lb (102.1 kg)   22 227 lb (103 kg)   22 226 lb (102.5 kg)       BP Readings from Last 3 Encounters:   22 (!) 152/80   22 (!) 142/96   22 (!) 146/92         SUBJECTIVE/OBJECTIVE:    New patient seen today for sinuses in his right nasal septum. The patient states that he suffers from recurrent sinus infections with several times a year. He also suffers from chronic sinus pressure around his eyes and in his cheeks. He does take Zyrtec and uses Flonase 4 nights a week. Recently he noticed a lesion on the right nasal septum. He saw an irritation there. He said he was placed on mupirocin for 10 days 3 times a day and it did not help. He also feels he got larger with mupirocin. Mild tenderness in this area. He has had occasional bleeds from the area non-smoker. Denies fevers night sweats or weight loss. Also occasional ear itching. Review of Systems   Constitutional: Negative. HENT: Positive for nosebleeds, sinus pressure and sinus pain. Eyes: Negative. Respiratory: Negative. Cardiovascular: Negative. Gastrointestinal: Negative. Endocrine: Negative. Musculoskeletal: Negative. Skin: Negative. Allergic/Immunologic: Negative. Neurological: Negative. Hematological: Negative. Psychiatric/Behavioral: Negative. Physical Exam  Constitutional:       Appearance: Normal appearance. He is normal weight. HENT:      Head: Normocephalic and atraumatic.       Right Ear: Tympanic membrane, ear canal and external ear normal.      Left Ear: Tympanic membrane, ear canal and external ear normal.      Nose: Nose normal.      Comments: Irritation and irregularity of the right nasal septum anteriorly     Mouth/Throat:      Mouth: Mucous membranes are moist.      Pharynx: Oropharynx is clear. Eyes:      Extraocular Movements: Extraocular movements intact. Pupils: Pupils are equal, round, and reactive to light. Cardiovascular:      Rate and Rhythm: Normal rate and regular rhythm. Pulmonary:      Effort: Pulmonary effort is normal.      Breath sounds: Normal breath sounds. Musculoskeletal:      Cervical back: Normal range of motion. Skin:     General: Skin is warm and dry. Neurological:      General: No focal deficit present. Mental Status: He is alert and oriented to person, place, and time. Psychiatric:         Mood and Affect: Mood normal.         Behavior: Behavior normal.              ASSESSMENT/PLAN:    1. Internal nasal lesion  -     CT SINUS WO CONTRAST; Future  2. Acute recurrent maxillary sinusitis  -     CT SINUS WO CONTRAST; Future  He has not had a CT sinus to evaluate his sinuses. I will set him up for this. Also I will get a look at the lesion on the right nasal septum. Off on Flonase. Can use CPAP. Return in about 3 weeks (around 5/4/2022) for Sinusitis and nose. An electronic signature was used to authenticate this note. Julián Hudson MD       Please note that this chart was generated using dragon dictation software. Although every effort was made to ensure the accuracy of this automated transcription, some errors in transcription may have occurred.

## 2022-05-04 ENCOUNTER — OFFICE VISIT (OUTPATIENT)
Dept: ENT CLINIC | Age: 52
End: 2022-05-04
Payer: COMMERCIAL

## 2022-05-04 VITALS
WEIGHT: 228 LBS | SYSTOLIC BLOOD PRESSURE: 146 MMHG | HEIGHT: 68 IN | BODY MASS INDEX: 34.56 KG/M2 | DIASTOLIC BLOOD PRESSURE: 102 MMHG

## 2022-05-04 DIAGNOSIS — J34.89 INTERNAL NASAL LESION: Primary | ICD-10-CM

## 2022-05-04 PROCEDURE — 99214 OFFICE O/P EST MOD 30 MIN: CPT | Performed by: OTOLARYNGOLOGY

## 2022-05-04 ASSESSMENT — ENCOUNTER SYMPTOMS
EYES NEGATIVE: 1
GASTROINTESTINAL NEGATIVE: 1
ALLERGIC/IMMUNOLOGIC NEGATIVE: 1
RESPIRATORY NEGATIVE: 1

## 2022-05-04 NOTE — PROGRESS NOTES
[address] [place] [address] (:  1970) is a 47 y.o. male, Established patient, here for evaluation of the following chief complaint(s):  Follow-up (Nasal Lesion and Sinusitis )      Vitals:    22 0845 22 0857   BP: (!) 144/100 (!) 146/102   Weight: 228 lb (103.4 kg)    Height: 5' 8\" (1.727 m)        Wt Readings from Last 3 Encounters:   22 228 lb (103.4 kg)   22 225 lb (102.1 kg)   22 227 lb (103 kg)       BP Readings from Last 3 Encounters:   22 (!) 146/102   22 (!) 152/80   22 (!) 142/96         SUBJECTIVE/OBJECTIVE:    Patient seen today for right nasal lesion. I ordered a CT scan to evaluate his sinuses and the lesion on the right septum. Sinuses are relatively clear and no lesions seen on CT scan. Patient still complains of nonhealing area on the right septum. He is concerned about this. He has used antibiotic ointment and nothing seems to help. Review of Systems   Constitutional: Negative. HENT: Negative. Eyes: Negative. Respiratory: Negative. Cardiovascular: Negative. Gastrointestinal: Negative. Endocrine: Negative. Musculoskeletal: Negative. Skin: Negative. Allergic/Immunologic: Negative. Neurological: Negative. Hematological: Negative. Psychiatric/Behavioral: Negative. Physical Exam  Vitals reviewed. Constitutional:       Appearance: Normal appearance. He is normal weight. HENT:      Head: Normocephalic and atraumatic. Right Ear: Tympanic membrane, ear canal and external ear normal.      Left Ear: Tympanic membrane, ear canal and external ear normal.      Nose: Nose normal.      Comments: Irritated region right septum nonhealing     Mouth/Throat:      Mouth: Mucous membranes are moist.      Pharynx: Oropharynx is clear. Eyes:      Extraocular Movements: Extraocular movements intact. Pupils: Pupils are equal, round, and reactive to light.    Cardiovascular:      Rate and Rhythm: Normal rate and regular rhythm. Pulmonary:      Effort: Pulmonary effort is normal.      Breath sounds: Normal breath sounds. Musculoskeletal:      Cervical back: Normal range of motion. Skin:     General: Skin is warm and dry. Neurological:      General: No focal deficit present. Mental Status: He is alert and oriented to person, place, and time. Psychiatric:         Mood and Affect: Mood normal.         Behavior: Behavior normal.              ASSESSMENT/PLAN:    1. Internal nasal lesion  -     EXCISION LESION / POLYP NASAL; Future  Plan for trip to operating room for excisional biopsy. Risk and benefits discussed and patient would like to proceed    Return in about 3 weeks (around 5/25/2022) for Postop follow-up and path review. An electronic signature was used to authenticate this note. Loy Davalos MD       Please note that this chart was generated using dragon dictation software. Although every effort was made to ensure the accuracy of this automated transcription, some errors in transcription may have occurred.

## 2022-05-12 ENCOUNTER — HOSPITAL ENCOUNTER (OUTPATIENT)
Dept: PREADMISSION TESTING | Age: 52
Discharge: HOME | End: 2022-05-16
Payer: COMMERCIAL

## 2022-05-12 VITALS — BODY MASS INDEX: 34.56 KG/M2 | HEIGHT: 68 IN | WEIGHT: 228 LBS

## 2022-05-12 LAB
ANION GAP SERPL CALCULATED.3IONS-SCNC: 13 MMOL/L (ref 7–19)
BASOPHILS ABSOLUTE: 0.1 K/UL (ref 0–0.2)
BASOPHILS RELATIVE PERCENT: 0.7 % (ref 0–1)
BUN BLDV-MCNC: 19 MG/DL (ref 6–20)
CALCIUM SERPL-MCNC: 9.7 MG/DL (ref 8.6–10)
CHLORIDE BLD-SCNC: 98 MMOL/L (ref 98–111)
CO2: 26 MMOL/L (ref 22–29)
CREAT SERPL-MCNC: 1.3 MG/DL (ref 0.5–1.2)
EKG P AXIS: 34 DEGREES
EKG P-R INTERVAL: 136 MS
EKG Q-T INTERVAL: 352 MS
EKG QRS DURATION: 88 MS
EKG QTC CALCULATION (BAZETT): 376 MS
EKG T AXIS: 37 DEGREES
EOSINOPHILS ABSOLUTE: 0.2 K/UL (ref 0–0.6)
EOSINOPHILS RELATIVE PERCENT: 3.1 % (ref 0–5)
GFR AFRICAN AMERICAN: >59
GFR NON-AFRICAN AMERICAN: 58
GLUCOSE BLD-MCNC: 100 MG/DL (ref 74–109)
HCT VFR BLD CALC: 54.2 % (ref 42–52)
HEMOGLOBIN: 17.8 G/DL (ref 14–18)
IMMATURE GRANULOCYTES #: 0 K/UL
LYMPHOCYTES ABSOLUTE: 2.1 K/UL (ref 1.1–4.5)
LYMPHOCYTES RELATIVE PERCENT: 28.1 % (ref 20–40)
MCH RBC QN AUTO: 31.5 PG (ref 27–31)
MCHC RBC AUTO-ENTMCNC: 32.8 G/DL (ref 33–37)
MCV RBC AUTO: 95.9 FL (ref 80–94)
MONOCYTES ABSOLUTE: 1 K/UL (ref 0–0.9)
MONOCYTES RELATIVE PERCENT: 12.5 % (ref 0–10)
NEUTROPHILS ABSOLUTE: 4.2 K/UL (ref 1.5–7.5)
NEUTROPHILS RELATIVE PERCENT: 55.3 % (ref 50–65)
PDW BLD-RTO: 12.9 % (ref 11.5–14.5)
PLATELET # BLD: 290 K/UL (ref 130–400)
PMV BLD AUTO: 10 FL (ref 9.4–12.4)
POTASSIUM SERPL-SCNC: 3.9 MMOL/L (ref 3.5–5)
RBC # BLD: 5.65 M/UL (ref 4.7–6.1)
SODIUM BLD-SCNC: 137 MMOL/L (ref 136–145)
WBC # BLD: 7.6 K/UL (ref 4.8–10.8)

## 2022-05-12 PROCEDURE — 85025 COMPLETE CBC W/AUTO DIFF WBC: CPT

## 2022-05-12 PROCEDURE — 80048 BASIC METABOLIC PNL TOTAL CA: CPT

## 2022-05-12 PROCEDURE — 93005 ELECTROCARDIOGRAM TRACING: CPT | Performed by: ANESTHESIOLOGY

## 2022-05-12 RX ORDER — TESTOSTERONE CYPIONATE 200 MG/ML
VIAL (ML) INTRAMUSCULAR
COMMUNITY

## 2022-05-24 ENCOUNTER — TELEPHONE (OUTPATIENT)
Dept: ENT CLINIC | Age: 52
End: 2022-05-24

## 2022-06-03 DIAGNOSIS — E78.00 HYPERCHOLESTEROLEMIA: ICD-10-CM

## 2022-06-03 DIAGNOSIS — I10 ESSENTIAL (PRIMARY) HYPERTENSION: ICD-10-CM

## 2022-06-06 ENCOUNTER — HOSPITAL ENCOUNTER (OUTPATIENT)
Age: 52
Setting detail: SPECIMEN
Discharge: HOME | End: 2022-06-06
Payer: COMMERCIAL

## 2022-06-06 ENCOUNTER — ANESTHESIA EVENT (OUTPATIENT)
Dept: OPERATING ROOM | Age: 52
End: 2022-06-06

## 2022-06-06 ENCOUNTER — HOSPITAL ENCOUNTER (OUTPATIENT)
Age: 52
Setting detail: OUTPATIENT SURGERY
Discharge: HOME | End: 2022-06-06
Attending: INTERNAL MEDICINE | Admitting: INTERNAL MEDICINE
Payer: COMMERCIAL

## 2022-06-06 ENCOUNTER — APPOINTMENT (OUTPATIENT)
Dept: OPERATING ROOM | Age: 52
End: 2022-06-06

## 2022-06-06 ENCOUNTER — ANESTHESIA (OUTPATIENT)
Dept: OPERATING ROOM | Age: 52
End: 2022-06-06

## 2022-06-06 VITALS
HEART RATE: 72 BPM | BODY MASS INDEX: 34.1 KG/M2 | WEIGHT: 225 LBS | RESPIRATION RATE: 16 BRPM | DIASTOLIC BLOOD PRESSURE: 92 MMHG | SYSTOLIC BLOOD PRESSURE: 138 MMHG | OXYGEN SATURATION: 98 % | TEMPERATURE: 97.5 F | HEIGHT: 68 IN

## 2022-06-06 DIAGNOSIS — E29.1 HYPOGONADISM IN MALE: ICD-10-CM

## 2022-06-06 DIAGNOSIS — E78.00 HYPERCHOLESTEROLEMIA: ICD-10-CM

## 2022-06-06 LAB
ALBUMIN SERPL-MCNC: 4.7 G/DL (ref 3.5–5.2)
ALP BLD-CCNC: 76 U/L (ref 40–130)
ALT SERPL-CCNC: 29 U/L (ref 5–41)
ANION GAP SERPL CALCULATED.3IONS-SCNC: 12 MMOL/L (ref 7–19)
AST SERPL-CCNC: 21 U/L (ref 5–40)
BASOPHILS ABSOLUTE: 0 K/UL (ref 0–0.2)
BASOPHILS RELATIVE PERCENT: 0.6 % (ref 0–1)
BILIRUB SERPL-MCNC: 1.2 MG/DL (ref 0.2–1.2)
BUN BLDV-MCNC: 13 MG/DL (ref 6–20)
CALCIUM SERPL-MCNC: 9.4 MG/DL (ref 8.6–10)
CHLORIDE BLD-SCNC: 104 MMOL/L (ref 98–111)
CHOLESTEROL, TOTAL: 160 MG/DL (ref 160–199)
CO2: 24 MMOL/L (ref 22–29)
CREAT SERPL-MCNC: 1.1 MG/DL (ref 0.5–1.2)
EOSINOPHILS ABSOLUTE: 0.2 K/UL (ref 0–0.6)
EOSINOPHILS RELATIVE PERCENT: 2.1 % (ref 0–5)
GFR AFRICAN AMERICAN: >59
GFR NON-AFRICAN AMERICAN: >60
GLUCOSE BLD-MCNC: 104 MG/DL (ref 74–109)
HCT VFR BLD CALC: 53.9 % (ref 42–52)
HDLC SERPL-MCNC: 37 MG/DL (ref 55–121)
HEMOGLOBIN: 17.7 G/DL (ref 14–18)
IMMATURE GRANULOCYTES #: 0 K/UL
LDL CHOLESTEROL CALCULATED: 76 MG/DL
LYMPHOCYTES ABSOLUTE: 2.1 K/UL (ref 1.1–4.5)
LYMPHOCYTES RELATIVE PERCENT: 28.4 % (ref 20–40)
MCH RBC QN AUTO: 30.7 PG (ref 27–31)
MCHC RBC AUTO-ENTMCNC: 32.8 G/DL (ref 33–37)
MCV RBC AUTO: 93.6 FL (ref 80–94)
MONOCYTES ABSOLUTE: 0.6 K/UL (ref 0–0.9)
MONOCYTES RELATIVE PERCENT: 7.9 % (ref 0–10)
NEUTROPHILS ABSOLUTE: 4.4 K/UL (ref 1.5–7.5)
NEUTROPHILS RELATIVE PERCENT: 60.7 % (ref 50–65)
PDW BLD-RTO: 12.7 % (ref 11.5–14.5)
PLATELET # BLD: 274 K/UL (ref 130–400)
PMV BLD AUTO: 9.4 FL (ref 9.4–12.4)
POTASSIUM SERPL-SCNC: 4.6 MMOL/L (ref 3.5–5)
RBC # BLD: 5.76 M/UL (ref 4.7–6.1)
SODIUM BLD-SCNC: 140 MMOL/L (ref 136–145)
TESTOSTERONE TOTAL: 1021 NG/DL (ref 193–740)
TOTAL CK: 112 U/L (ref 39–308)
TOTAL PROTEIN: 7.5 G/DL (ref 6.6–8.7)
TRIGL SERPL-MCNC: 233 MG/DL (ref 0–149)
WBC # BLD: 7.2 K/UL (ref 4.8–10.8)

## 2022-06-06 PROCEDURE — 45380 COLONOSCOPY AND BIOPSY: CPT | Performed by: INTERNAL MEDICINE

## 2022-06-06 PROCEDURE — 88305 TISSUE EXAM BY PATHOLOGIST: CPT

## 2022-06-06 PROCEDURE — G8918 PT W/O PREOP ORDER IV AB PRO: HCPCS

## 2022-06-06 PROCEDURE — G8907 PT DOC NO EVENTS ON DISCHARG: HCPCS

## 2022-06-06 PROCEDURE — 45380 COLONOSCOPY AND BIOPSY: CPT

## 2022-06-06 RX ORDER — SODIUM CHLORIDE 0.9 % (FLUSH) 0.9 %
5-40 SYRINGE (ML) INJECTION EVERY 12 HOURS SCHEDULED
Status: CANCELLED | OUTPATIENT
Start: 2022-06-06

## 2022-06-06 RX ORDER — SODIUM CHLORIDE 9 MG/ML
INJECTION, SOLUTION INTRAVENOUS PRN
Status: CANCELLED | OUTPATIENT
Start: 2022-06-06

## 2022-06-06 RX ORDER — ATORVASTATIN CALCIUM 40 MG/1
40 TABLET, FILM COATED ORAL DAILY
Qty: 90 TABLET | Refills: 1 | Status: SHIPPED | OUTPATIENT
Start: 2022-06-06

## 2022-06-06 RX ORDER — LIDOCAINE HYDROCHLORIDE 10 MG/ML
INJECTION, SOLUTION EPIDURAL; INFILTRATION; INTRACAUDAL; PERINEURAL PRN
Status: DISCONTINUED | OUTPATIENT
Start: 2022-06-06 | End: 2022-06-06 | Stop reason: SDUPTHER

## 2022-06-06 RX ORDER — DIPHENHYDRAMINE HYDROCHLORIDE 50 MG/ML
12.5 INJECTION INTRAMUSCULAR; INTRAVENOUS
Status: CANCELLED | OUTPATIENT
Start: 2022-06-06 | End: 2022-06-06

## 2022-06-06 RX ORDER — SODIUM CHLORIDE 0.9 % (FLUSH) 0.9 %
5-40 SYRINGE (ML) INJECTION PRN
Status: CANCELLED | OUTPATIENT
Start: 2022-06-06

## 2022-06-06 RX ORDER — ONDANSETRON 2 MG/ML
4 INJECTION INTRAMUSCULAR; INTRAVENOUS
Status: CANCELLED | OUTPATIENT
Start: 2022-06-06 | End: 2022-06-06

## 2022-06-06 RX ORDER — SODIUM CHLORIDE 9 MG/ML
INJECTION, SOLUTION INTRAVENOUS CONTINUOUS
Status: DISCONTINUED | OUTPATIENT
Start: 2022-06-06 | End: 2022-06-06 | Stop reason: HOSPADM

## 2022-06-06 RX ORDER — RAMIPRIL 10 MG/1
CAPSULE ORAL
Qty: 60 CAPSULE | Refills: 5 | Status: SHIPPED | OUTPATIENT
Start: 2022-06-06

## 2022-06-06 RX ORDER — PROPOFOL 10 MG/ML
INJECTION, EMULSION INTRAVENOUS PRN
Status: DISCONTINUED | OUTPATIENT
Start: 2022-06-06 | End: 2022-06-06 | Stop reason: SDUPTHER

## 2022-06-06 RX ADMIN — LIDOCAINE HYDROCHLORIDE 30 MG: 10 INJECTION, SOLUTION EPIDURAL; INFILTRATION; INTRACAUDAL; PERINEURAL at 08:05

## 2022-06-06 RX ADMIN — PROPOFOL 260 MG: 10 INJECTION, EMULSION INTRAVENOUS at 08:05

## 2022-06-06 RX ADMIN — SODIUM CHLORIDE: 9 INJECTION, SOLUTION INTRAVENOUS at 07:28

## 2022-06-06 NOTE — OP NOTE
Patient: Queenie Burrosw : 1970  Med Rec#: [id number] Acc#: [id number]   Primary Care Provider Viki Hou MD    Date of Procedure:  2022    Endoscopist: Claudine Mullen MD    Referring Provider: Viki Hou MD    Operation Performed: Colonoscopy with biopsy    Indications: Screening- family hx of colon polyps    Anesthesia:  Sedation was administered by anesthesia who monitored the patient during the procedure. I met with Queenie Burrows prior to procedure. We discussed the procedure itself, and I have discussed the risks of endoscopy (including-- but not limited to-- pain, discomfort, bleeding potentially requiring second endoscopic procedure and/or blood transfusion, organ perforation requiring operative repair, damage to organs near the colon, infection, aspiration, cardiopulmonary/allergic reaction), benefits, indications to endoscopy. Additionally, we discussed options other than colonoscopy. The patient expressed understanding. All questions answered. The patient decided to proceed with the procedure. Signed informed consent was placed on the chart. Blood Loss: minimal    Withdrawal time: > 6 min  Bowel Prep: adequate     Complications: no immediate complications    DESCRIPTION OF PROCEDURE:     A time out was performed. After written informed consent was obtained, the patient was placed in the left lateral position. The perianal area was inspected, and a digital rectal exam was performed. A rectal exam was performed: normal tone, no palpable lesions. At this point, a forward viewing Olympus colonoscope was inserted into the anus and carefully advanced to the cecum. The cecum was identified by the ileocecal valve and the appendiceal orifice. The colonoscope was then slowly withdrawn with careful inspection of the mucosa in a linear and circumferential fashion. The scope was retroflexed in the rectum.  Suction was utilized during the procedure to remove as much air as possible from the bowel. The colonoscope was removed from the patient, and the procedure was terminated. Findings are listed below. Findings: The mucosa appeared normal throughout the entire examined colon. In the transverse colon, a 4 mm sessile polyp was removed completely with forceps polypectomy. Retroflexion in the rectum was normal and revealed no further abnormalities. Recommendations:  1. Repeat colonoscopy: pending pathology - 5 years  2. Await biopsy results    Findings and recommendations were discussed w/ the patient. A copy of the images was provided. Lizette Arita am scribing for and in the presence of Dr. Ro Bustamante MD.  Electronically signed by Meaghan Macedo RN on 6/6/2022 at 7:45 AM    I personally performed the services described in this documentation as scribed by Ari Castro, and it appears accurate and complete.      Ro Bustamante MD  6/6/2022

## 2022-06-06 NOTE — H&P
Patient Name: Armando Han  : 1970  MRN: [id number]  DATE: 22    Allergies: Allergies   Allergen Reactions    Influenza Virus Vaccine Other (See Comments)     \"sick all winter\"    Azithromycin      Childhood allergy    Penicillins      Childhood allergy        ENDOSCOPY  History and Physical    Procedure:    [] Diagnostic Colonoscopy       [x] Screening Colonoscopy  [] EGD      [] ERCP      [] EUS       [] Other    [x] Previous office notes/History and Physical reviewed from the patients chart. Please see EMR for further details of HPI. I have examined the patient's status immediately prior to the procedure and:      Indications/HPI:       [x] Screening              [] History of Polyps      [x]Fhx of colon CA/polyps []+Cologard/DNA/Stool Testing      Anesthesia:   [x] MAC [] Moderate Sedation   [] General   [] None     ROS: 12 pt review of systems was negative unless stated above    Medications:   Prior to Admission medications    Medication Sig Start Date End Date Taking? Authorizing Provider   Testosterone Cypionate 200 MG/ML SOLN Inject as directed every 14 days    Historical Provider, MD   cetirizine (ZYRTEC) 10 MG tablet Take 10 mg by mouth daily    Historical Provider, MD   lamoTRIgine (LAMICTAL) 100 MG tablet TAKE 1 TABLET BY MOUTH EVERY DAY  Patient taking differently: Take 100 mg by mouth daily  22   Jeny Becker MD   [facility] NAS[facility]) 2 % nasal ointment Take by Nasal route 2 times daily.   Patient not taking: Reported on 2022 3/25/22   Jeny Becker MD   SYRINGE-NEEDLE, DISP, 3 ML (SAFETY SYRINGE/NEEDLE) 22G X 1-/2\" 3 ML MISC Use one needle to administer testosterone every 2 weeks 3/24/22   Jeny Becker MD   SYRINGE-NEEDLE, DISP, 3 ML 18G X 1-1/2\" 3 ML MISC Use one syringe to draw testosterone every 2 weeks 3/24/22   Jeny Becker MD   buPROPion (WELLBUTRIN XL) 150 MG extended release tablet TAKE 1 TABLET BY MOUTH EVERY DAY IN THE MORNING  Patient taking differently: Take 150 mg by mouth every morning TAKE 1 TABLET BY MOUTH EVERY DAY IN THE MORNING 1/25/22   Mikhail Albert MD   atorvastatin (LIPITOR) 40 MG tablet Take 1 tablet by mouth daily 12/13/21   Mikhail Albert MD   hydroCHLOROthiazide (HYDRODIURIL) 25 MG tablet TAKE 1 TABLET BY MOUTH EVERY MORNING 11/23/21   Mikhail Albert MD   ramipril (ALTACE) 10 MG capsule TAKE 2 CAPSULES BY MOUTH EVERY DAY  Patient taking differently: Take 20 mg by mouth daily TAKE 2 CAPSULES BY MOUTH EVERY DAY 11/23/21   Mikhail Albert MD   CPAP Machine MISC CPAP and supplies 4/16/21   Mikhail Albert MD   omeprazole (PRILOSEC) 20 MG delayed release capsule Take 1 capsule by mouth Daily 6/21/18   Mikhail Albert MD       Past Medical History:  Past Medical History:   Diagnosis Date    GERD (gastroesophageal reflux disease)     Hyperlipidemia     Hypertension     Kidney stone     Sleep apnea     cpap       Past Surgical History:  Past Surgical History:   Procedure Laterality Date    CARPAL TUNNEL RELEASE      Right    LITHOTRIPSY      multiple       Social History:  Social History     Tobacco Use    Smoking status: Never Smoker    Smokeless tobacco: Never Used   Vaping Use    Vaping Use: Never used   Substance Use Topics    Alcohol use: No    Drug use: No       Vital Signs:   Vitals:    06/06/22 0726   BP: (!) 135/99   Pulse: 78   Resp: 18   Temp: 97.5 °F (36.4 °C)   SpO2: 97%        Physical Exam:  Cardiac:  [x]WNL []Comments:  Pulmonary:  [x]WNL   []Comments:  Neuro/Mental Status:  [x]WNL  []Comments:  Abdominal:  [x]WNL    []Comments:  Other:   []WNL  []Comments:    Informed Consent:  The risks and benefits of the procedure have been discussed with either the patient or if they cannot consent, their representative. Assessment:  Patient examined and appropriate for planned sedation and procedure. Plan:  Proceed with planned sedation and procedure as above.     Ivonne Duran giles Alejandra scribing for and in the presence of Dr. Zeke Mitchell MD.  Electronically signed by Carlos Berger RN on 6/6/2022 at 7:40 AM    I personally performed the services described in this documentation as scribed by Briseyda Leary, and it appears accurate and complete.      Zeke Mitchell MD  6/6/2022

## 2022-06-06 NOTE — ANESTHESIA PRE PROCEDURE
Department of Anesthesiology  Preprocedure Note       Name:  Keri Gusman   Age:  47 y.o.  :  1970                                          MRN:  [id number]         Date:  2022      Surgeon: Joni Ogden):  Ankita Stevens MD    Procedure: Procedure(s):  COLORECTAL CANCER SCREENING, NOT HIGH RISK    Medications prior to admission:   Prior to Admission medications    Medication Sig Start Date End Date Taking? Authorizing Provider   Testosterone Cypionate 200 MG/ML SOLN Inject as directed every 14 days    Historical Provider, MD   cetirizine (ZYRTEC) 10 MG tablet Take 10 mg by mouth daily    Historical Provider, MD   lamoTRIgine (LAMICTAL) 100 MG tablet TAKE 1 TABLET BY MOUTH EVERY DAY  Patient taking differently: Take 100 mg by mouth daily  22   Bobby Schultz MD   mupirocin [facility] NASAL) 2 % nasal ointment Take by Nasal route 2 times daily.   Patient not taking: Reported on 2022 3/25/22   Bobby Schultz MD   SYRINGE-NEEDLE, DISP, 3 ML (SAFETY SYRINGE/NEEDLE) 22G X 1-1/2\" 3 ML MISC Use one needle to administer testosterone every 2 weeks 3/24/22   Bobby Schultz MD   SYRINGE-NEEDLE, DISP, 3 ML 18G X 1-1/2\" 3 ML MISC Use one syringe to draw testosterone every 2 weeks 3/24/22   Bobby Schultz MD   buPROPion (WELLBUTRIN XL) 150 MG extended release tablet TAKE 1 TABLET BY MOUTH EVERY DAY IN THE MORNING  Patient taking differently: Take 150 mg by mouth every morning TAKE 1 TABLET BY MOUTH EVERY DAY IN THE MORNING 22   Bobby Schultz MD   atorvastatin (LIPITOR) 40 MG tablet Take 1 tablet by mouth daily 21   Bobby Schultz MD   hydroCHLOROthiazide (HYDRODIURIL) 25 MG tablet TAKE 1 TABLET BY MOUTH EVERY MORNING 21   Bobby Schultz MD   ramipril (ALTACE) 10 MG capsule TAKE 2 [place] 5  Patient taking differently: Take 20 mg by mouth daily TAKE 2 CAPSULES BY MOUTH EVERY DAY 21   Bobby Schultz MD   CPAP Machine MISC CPAP and supplies 21 Rebekah Fisher MD   omeprazole (PRILOSEC) 20 MG delayed release capsule Take 1 capsule by mouth Daily 6/21/18   Rebekah Fisher MD       Current medications:    No current facility-administered medications for this encounter. Allergies: Allergies   Allergen Reactions    Influenza Virus Vaccine Other (See Comments)     \"sick all winter\"    Azithromycin      Childhood allergy    Penicillins      Childhood allergy       Problem List:  There is no problem list on file for this patient. Past Medical History:        Diagnosis Date    GERD (gastroesophageal reflux disease)     Hyperlipidemia     Hypertension     Kidney stone     Sleep apnea     cpap       Past Surgical History:        Procedure Laterality Date    CARPAL TUNNEL RELEASE      Right    LITHOTRIPSY      multiple       Social History:    Social History     Tobacco Use    Smoking status: Never Smoker    Smokeless tobacco: Never Used   Substance Use Topics    Alcohol use: No                                Counseling given: Not Answered      Vital Signs (Current): There were no vitals filed for this visit.                                            BP Readings from Last 3 Encounters:   05/04/22 (!) 146/102   04/13/22 (!) 152/80   03/24/22 (!) 142/96       NPO Status: Time of last liquid consumption: 0200                        Time of last solid consumption: 2300                        Date of last liquid consumption: 06/06/22                        Date of last solid food consumption: 06/04/22    BMI:   Wt Readings from Last 3 Encounters:   05/12/22 228 lb (103.4 kg)   05/04/22 228 lb (103.4 kg)   04/13/22 225 lb (102.1 kg)     There is no height or weight on file to calculate BMI.    CBC:   Lab Results   Component Value Date    WBC 7.6 05/12/2022    RBC 5.65 05/12/2022    HGB 17.8 05/12/2022    HCT 54.2 05/12/2022    MCV 95.9 05/12/2022    RDW 12.9 05/12/2022     05/12/2022       CMP:   Lab Results   Component Value Date  05/12/2022    K 3.9 05/12/2022    CL 98 05/12/2022    CO2 26 05/12/2022    BUN 19 05/12/2022    CREATININE 1.3 05/12/2022    GFRAA >59 05/12/2022    LABGLOM 58 05/12/2022    GLUCOSE 100 05/12/2022    PROT 7.2 12/06/2021    CALCIUM 9.7 05/12/2022    BILITOT 0.6 12/06/2021    ALKPHOS 57 12/06/2021    AST 23 12/06/2021    ALT 42 12/06/2021       POC Tests: No results for input(s): POCGLU, POCNA, POCK, POCCL, POCBUN, POCHEMO, POCHCT in the last 72 hours. Coags: No results found for: PROTIME, INR, APTT    HCG (If Applicable): No results found for: PREGTESTUR, PREGSERUM, HCG, HCGQUANT     ABGs: No results found for: PHART, PO2ART, AUZ3RMD, ETT9FPS, BEART, M4QUTZWW     Type & Screen (If Applicable):  No results found for: LABABO, LABRH    Drug/Infectious Status (If Applicable):  No results found for: HIV, HEPCAB    COVID-19 Screening (If Applicable): No results found for: COVID19        Anesthesia Evaluation    Airway: Mallampati: I  TM distance: >3 FB   Neck ROM: full  Mouth opening: > = 3 FB   Dental: normal exam         Pulmonary:normal exam  breath sounds clear to auscultation  (+) sleep apnea: on CPAP,                             Cardiovascular:  Exercise tolerance: good (>4 METS),           Rhythm: regular  Rate: normal           Beta Blocker:  Not on Beta Blocker         Neuro/Psych:   Negative Neuro/Psych ROS              GI/Hepatic/Renal:   (+) GERD: well controlled, morbid obesity          Endo/Other:                     Abdominal:             Vascular: negative vascular ROS. Other Findings:           Anesthesia Plan      general and TIVA     ASA 2       Induction: intravenous. Anesthetic plan and risks discussed with patient.                         ELLEN Rangel - CRNA   6/6/2022

## 2022-06-24 ENCOUNTER — OFFICE VISIT (OUTPATIENT)
Dept: FAMILY MEDICINE CLINIC | Age: 52
End: 2022-06-24
Payer: COMMERCIAL

## 2022-06-24 VITALS
WEIGHT: 224 LBS | TEMPERATURE: 98.5 F | DIASTOLIC BLOOD PRESSURE: 94 MMHG | SYSTOLIC BLOOD PRESSURE: 142 MMHG | BODY MASS INDEX: 34.06 KG/M2 | HEART RATE: 85 BPM | OXYGEN SATURATION: 96 %

## 2022-06-24 DIAGNOSIS — N41.0 ACUTE PROSTATITIS: ICD-10-CM

## 2022-06-24 DIAGNOSIS — E29.1 HYPOGONADISM IN MALE: Primary | ICD-10-CM

## 2022-06-24 DIAGNOSIS — G47.33 OSA ON CPAP: ICD-10-CM

## 2022-06-24 DIAGNOSIS — E78.00 HYPERCHOLESTEROLEMIA: ICD-10-CM

## 2022-06-24 DIAGNOSIS — F41.8 DEPRESSION WITH ANXIETY: ICD-10-CM

## 2022-06-24 DIAGNOSIS — I10 ESSENTIAL (PRIMARY) HYPERTENSION: ICD-10-CM

## 2022-06-24 DIAGNOSIS — K21.9 GASTROESOPHAGEAL REFLUX DISEASE WITHOUT ESOPHAGITIS: ICD-10-CM

## 2022-06-24 DIAGNOSIS — R36.1 HEMATOSPERMIA: ICD-10-CM

## 2022-06-24 DIAGNOSIS — Z99.89 OSA ON CPAP: ICD-10-CM

## 2022-06-24 LAB
APPEARANCE FLUID: NORMAL
BILIRUBIN, POC: NORMAL
BLOOD URINE, POC: NORMAL
CLARITY, POC: CLEAR
COLOR, POC: NORMAL
GLUCOSE URINE, POC: 100
KETONES, POC: NORMAL
LEUKOCYTE EST, POC: NORMAL
NITRITE, POC: NORMAL
PH, POC: 6
PROTEIN, POC: NORMAL
SPECIFIC GRAVITY, POC: >=1.03
UROBILINOGEN, POC: 0.2

## 2022-06-24 PROCEDURE — 81002 URINALYSIS NONAUTO W/O SCOPE: CPT | Performed by: FAMILY MEDICINE

## 2022-06-24 PROCEDURE — 99214 OFFICE O/P EST MOD 30 MIN: CPT | Performed by: FAMILY MEDICINE

## 2022-06-24 RX ORDER — TESTOSTERONE CYPIONATE 200 MG/ML
INJECTION INTRAMUSCULAR
Qty: 10 ML | Refills: 1 | Status: SHIPPED | OUTPATIENT
Start: 2022-06-24 | End: 2023-04-14

## 2022-06-24 RX ORDER — SULFAMETHOXAZOLE AND TRIMETHOPRIM 800; 160 MG/1; MG/1
1 TABLET ORAL 2 TIMES DAILY
Qty: 60 TABLET | Refills: 0 | Status: SHIPPED | OUTPATIENT
Start: 2022-06-24 | End: 2022-08-03

## 2022-06-24 NOTE — PROGRESS NOTES
[facility] PHYSICIAN SERVICES  [facility] FAMILY MEDICINE  [address] [address]  Dept: [phone]  Dept Fax: [phone]  Loc: [phone]    Stephane Smith is a 47 y.o. male who presents today for his medical conditions/complaints as noted below. Stephane Smith is here for 6 Month Follow-Up        HPI:   CC: Here today to discuss the following:    Hypogonadism  Tolerating his current testosterone replacement therapy without adverse effects. He has noticed no symptoms of daytime somnolence consistent with sleep apnea. He has had no urinary symptoms. No mood disturbance. Overall satisfied with his current treatment    Hyperlipidemia  Tolerating current cholesterol medication without side effects. . Attempting to reduce processed sugar and cholesterol from diet. Depression with Anxiety  Compliant with medications. No adverse effects from medication. Depression and anxiety symptoms are stable today. No suicidal or homicidal ideation. Excessive worry, insomnia, and anxiousness are stable. Energy, concentration, and apathy are stable. DENZEL on CPAP  Compliant with CPAP. No daytime somnolence. Feels rested for the most part when wearing CPAP. Gastroesophageal Reflux Disease  Symptoms currently under control. Medication adequately controls symptoms. No hematochezia or melena. Hypertension  Compliant with medications. No adverse effects from medication. No lightheadedness, palpitations, or chest pain. He also complains of blood in his sperm. Having no dysuria or hematuria. No fever chills. This is been going on for the past 4 weeks. HPI    Subjective:      Review of Systems  Review of Systems   Constitutional: Negative for chills and fever. HENT: Negative for congestion. Respiratory: Negative for cough, chest tightness and shortness of breath. Cardiovascular: Negative for chest pain, palpitations and leg swelling.    Gastrointestinal: Negative for abdominal pain, anal bleeding, constipation, diarrhea and nausea. Genitourinary: Negative for difficulty urinating. Psychiatric/Behavioral: Negative. SeeHPI for visit specific review of symptoms. All others negative      Objective:   BP (!) 142/94   Pulse 85   Temp 98.5 °F (36.9 °C)   Wt 224 lb (101.6 kg)   SpO2 96%   BMI 34.06 kg/m²   Physical Exam  Physical Exam   Constitutional: He appears well-developed. Does not appear ill. Neck: Neck supple. No masses. Neck Symmetric. Normal tracheal position. No thyroid enlargement  Cardiovascular: Normal rate and regular rhythm. Exam reveals no friction rub. No murmur heard. Respiratory:  Effort normal and breath sounds normal. No respiratory distress. No wheezes. No rales. No use of accessory muscles or intercostal retractions. Neurological: alert. Psychiatric: normal mood and affect.  His behavior is normal.       Recent Results (from the past 672 hour(s))   Testosterone    Collection Time: 06/06/22  6:43 AM   Result Value Ref Range    Testosterone 1,021.0 (H) 193.0 - 740.0 ng/dL   CBC Auto Differential    Collection Time: 06/06/22  6:43 AM   Result Value Ref Range    WBC 7.2 4.8 - 10.8 K/uL    RBC 5.76 4.70 - 6.10 M/uL    Hemoglobin 17.7 14.0 - 18.0 g/dL    Hematocrit 53.9 (H) 42.0 - 52.0 %    MCV 93.6 80.0 - 94.0 fL    MCH 30.7 27.0 - 31.0 pg    MCHC 32.8 (L) 33.0 - 37.0 g/dL    RDW 12.7 11.5 - 14.5 %    Platelets 243 285 - 682 K/uL    MPV 9.4 9.4 - 12.4 fL    Neutrophils % 60.7 50.0 - 65.0 %    Lymphocytes % 28.4 20.0 - 40.0 %    Monocytes % 7.9 0.0 - 10.0 %    Eosinophils % 2.1 0.0 - 5.0 %    Basophils % 0.6 0.0 - 1.0 %    Neutrophils Absolute 4.4 1.5 - 7.5 K/uL    Immature Granulocytes # 0.0 K/uL    Lymphocytes Absolute 2.1 1.1 - 4.5 K/uL    Monocytes Absolute 0.60 0.00 - 0.90 K/uL    Eosinophils Absolute 0.20 0.00 - 0.60 K/uL    Basophils Absolute 0.00 0.00 - 0.20 K/uL   Comprehensive Metabolic Panel    Collection Time: 06/06/22  6:43 AM Result Value Ref Range    Sodium 140 136 - 145 mmol/L    Potassium 4.6 3.5 - 5.0 mmol/L    Chloride 104 98 - 111 mmol/L    CO2 24 22 - 29 mmol/L    Anion Gap 12 7 - 19 mmol/L    Glucose 104 74 - 109 mg/dL    BUN 13 6 - 20 mg/dL    CREATININE 1.1 0.5 - 1.2 mg/dL    GFR Non-African American >60 >60    GFR African American >59 >59    Calcium 9.4 8.6 - 10.0 mg/dL    Total Protein 7.5 6.6 - 8.7 g/dL    Albumin 4.7 3.5 - 5.2 g/dL    Total Bilirubin 1.2 0.2 - 1.2 mg/dL    Alkaline Phosphatase 76 40 - 130 U/L    ALT 29 5 - 41 U/L    AST 21 5 - 40 U/L   Lipid Panel    Collection Time: 06/06/22  6:43 AM   Result Value Ref Range    Cholesterol, Total 160 160 - 199 mg/dL    Triglycerides 233 (H) 0 - 149 mg/dL    HDL 37 (L) 55 - 121 mg/dL    LDL Calculated 76 <100 mg/dL   CK    Collection Time: 06/06/22  6:43 AM   Result Value Ref Range    Total  39 - 308 U/L   POCT Urinalysis no Micro    Collection Time: 06/24/22 12:00 AM   Result Value Ref Range    Color, UA dark yellow     Clarity, UA clear     Glucose, UA      Bilirubin, UA neg     Ketones, UA neg     Spec Grav, UA >=1.030     Blood, UA POC trace-intact     pH, UA 6.0     Protein, UA POC neg     Urobilinogen, UA 0.2     Leukocytes, UA neg     Nitrite, UA neg     Appearance, Fluid     Culture, Urine    Collection Time: 06/24/22  3:56 PM    Specimen: Urine, clean catch   Result Value Ref Range    Urine Culture, Routine No growth at 36-48 hours                Assessment & Plan: The following diagnoses and conditions are stable with no further orders unless indicated:  1. Hypogonadism in male    Testosterone level elevated. Evidence of erythrocytosis    Testosterone cypionate: 200 mg per 1 mL:  · 1.5 mL every 14 days  ·   · Reduce testosterone cypionate to 1.25 mL every 14 days    Recheck in 1 month. 2. Hypercholesterolemia  Total cholesterol reduced from 230-160. LDL decreased from 140-76  Continue with atorvastatin 40 mg daily        3.  Depression with anxiety  Wellbutrin  Lamictal  Chronic issue stable  4. DENZEL on CPAP  Compliant and stable    5. Gastroesophageal reflux disease without esophagitis  Stable    6. Essential (primary) hypertension  BP Readings from Last 3 Encounters:   06/24/22 (!) 142/94   06/06/22 (!) 138/92   05/04/22 (!) 146/102     Blood pressure is elevated. Currently on ramipril 20 mg daily  He has discontinued his hydrochlorothiazide. Would benefit from restarting this medication for blood pressure control. Follow-up in 3 months    He has a history of prostatitis. He is having some perineal discomfort. Will place on Bactrim DS twice a day for 30 days  Will refer to urology for evaluation present pyospermia. Could be related to acute prostatitis. Return in about 3 months (around 9/24/2022) for Routine follow up - 15 minute visit. Discussed use, benefit, and side effects of prescribed medications. All patient questions answered. Pt voiced understanding. Reviewed health maintenance. Instructedto continue current medications, diet and exercise. Patient agreed with treatmentplan.  Follow up as directed.     _______________________________________________________________      Past Medical History:   Diagnosis Date    GERD (gastroesophageal reflux disease)     Hyperlipidemia     Hypertension     Kidney stone     Sleep apnea     cpap      Past Surgical History:   Procedure Laterality Date    CARPAL TUNNEL RELEASE Right     COLONOSCOPY N/A 06/06/2022    Dr Nirmal Parker, 5 yr recall   [facility] LITHOTRIPSY      multiple       Family History   Problem Relation Age of Onset    Diabetes Mother     High Blood Pressure Mother     Diabetes Father     Heart Disease Father     High Cholesterol Father        Social History     Tobacco Use    Smoking status: Never Smoker    Smokeless tobacco: Never Used   Substance Use Topics    Alcohol use: No     Current Outpatient Medications   Medication Sig Dispense Refill    testosterone cypionate (DEPOTESTOTERONE CYPIONATE) 200 MG/ML injection INJECT 1.5 ML IM EVERY 14 DAYS 10 mL 1    sulfamethoxazole-trimethoprim (BACTRIM DS) 800-160 MG per tablet Take 1 tablet by mouth 2 times daily 60 tablet 0    ramipril (ALTACE) 10 MG capsule TAKE 2 CAPSULES BY MOUTH EVERY DAY 60 capsule 5    atorvastatin (LIPITOR) 40 MG tablet Take 1 tablet by mouth daily 90 tablet 1    Testosterone Cypionate 200 MG/ML SOLN Inject as directed every 14 days      cetirizine (ZYRTEC) 10 MG tablet Take 10 mg by mouth daily      lamoTRIgine (LAMICTAL) 100 MG tablet TAKE 1 TABLET BY MOUTH EVERY DAY (Patient taking differently: Take 100 mg by mouth daily ) 30 tablet 3    SYRINGE-NEEDLE, DISP, 3 ML (SAFETY SYRINGE/NEEDLE) 22G X 1-1/2\" 3 ML MISC Use one needle to administer testosterone every 2 weeks 12 each 3    SYRINGE-NEEDLE, DISP, 3 ML 18G X 1-1/2\" 3 ML MISC Use one syringe to draw testosterone every 2 weeks 12 each 3    buPROPion (WELLBUTRIN XL) 150 MG extended release tablet TAKE 1 TABLET BY MOUTH EVERY DAY IN THE MORNING (Patient taking differently: Take 150 mg by mouth every morning TAKE 1 TABLET BY MOUTH EVERY DAY IN THE MORNING) 90 tablet 3    CPAP Machine MISC CPAP and supplies 1 each 0    omeprazole (PRILOSEC) 20 MG delayed release capsule Take 1 capsule by mouth Daily 30 capsule 5    mupirocin (BACTROBAN NASAL) 2 % nasal ointment Take by Nasal route 2 times daily. (Patient not taking: Reported on 5/12/2022) 1 each 3    hydroCHLOROthiazide (HYDRODIURIL) 25 MG tablet TAKE 1 TABLET BY MOUTH EVERY MORNING (Patient not taking: Reported on 6/24/2022) 30 tablet 5     No current facility-administered medications for this visit.      Allergies   Allergen Reactions    Influenza Virus Vaccine Other (See Comments)     \"sick all winter\"    Azithromycin      Childhood allergy    Penicillins      Childhood allergy       Health Maintenance   Topic Date Due    COVID-19 Vaccine (1) Never done    HIV screen Never done    Diabetes screen  Never done    Shingles vaccine (1 of 2) Never done    Depression Monitoring  03/24/2023    Lipids  06/06/2023    DTaP/Tdap/Td vaccine (2 - Td or Tdap) 12/20/2023    Colorectal Cancer Screen  06/06/2027    Hepatitis C screen  Completed    Hepatitis A vaccine  Aged Out    Hepatitis B vaccine  Aged Out    Hib vaccine  Aged Out    Meningococcal (ACWY) vaccine  Aged Out    Pneumococcal 0-64 years Vaccine  Aged Out       _______________________________________________________________    Note dictated using [id number] [facility]  Sometimes this dictation software makes erroneous transcriptions.

## 2022-06-26 LAB — URINE CULTURE, ROUTINE: NORMAL

## 2022-08-03 DIAGNOSIS — N41.0 ACUTE PROSTATITIS: ICD-10-CM

## 2022-08-03 RX ORDER — LAMOTRIGINE 100 MG/1
TABLET ORAL
Qty: 30 TABLET | Refills: 11 | Status: SHIPPED | OUTPATIENT
Start: 2022-08-03

## 2022-08-03 RX ORDER — SULFAMETHOXAZOLE AND TRIMETHOPRIM 800; 160 MG/1; MG/1
TABLET ORAL
Qty: 60 TABLET | Refills: 0 | Status: SHIPPED | OUTPATIENT
Start: 2022-08-03 | End: 2022-09-26 | Stop reason: ALTCHOICE

## 2022-08-03 NOTE — TELEPHONE ENCOUNTER
Rafal Alvarado called to request a refill on his medication.       Last office visit : 6/24/2022   Next office visit : 9/26/2022     Requested Prescriptions     Pending Prescriptions Disp Refills    sulfamethoxazole-trimethoprim (BACTRIM DS;SEPTRA DS) 800-160 MG per tablet [Pharmacy Med Name: SULFAMETHOXAZOLE-TMP DS TABLET] 60 tablet 0     Sig: TAKE 1 TABLET BY MOUTH TWICE A DAY    lamoTRIgine (LAMICTAL) 100 MG tablet [Pharmacy Med Name: LAMOTRIGINE 100 MG TABLET] 30 tablet 3     Sig: TAKE 1 TABLET BY MOUTH EVERY DAY            Dandy Lr

## 2022-09-26 ENCOUNTER — OFFICE VISIT (OUTPATIENT)
Dept: FAMILY MEDICINE CLINIC | Age: 52
End: 2022-09-26
Payer: COMMERCIAL

## 2022-09-26 VITALS
DIASTOLIC BLOOD PRESSURE: 88 MMHG | HEART RATE: 86 BPM | TEMPERATURE: 98.2 F | BODY MASS INDEX: 34.21 KG/M2 | WEIGHT: 225 LBS | SYSTOLIC BLOOD PRESSURE: 118 MMHG | OXYGEN SATURATION: 98 %

## 2022-09-26 DIAGNOSIS — M23.203 OLD TEAR OF MEDIAL MENISCUS OF RIGHT KNEE, UNSPECIFIED TEAR TYPE: ICD-10-CM

## 2022-09-26 DIAGNOSIS — G47.33 OSA ON CPAP: ICD-10-CM

## 2022-09-26 DIAGNOSIS — F41.8 DEPRESSION WITH ANXIETY: ICD-10-CM

## 2022-09-26 DIAGNOSIS — I10 ESSENTIAL (PRIMARY) HYPERTENSION: ICD-10-CM

## 2022-09-26 DIAGNOSIS — Z99.89 OSA ON CPAP: ICD-10-CM

## 2022-09-26 DIAGNOSIS — K21.9 GASTROESOPHAGEAL REFLUX DISEASE WITHOUT ESOPHAGITIS: ICD-10-CM

## 2022-09-26 DIAGNOSIS — N41.0 ACUTE PROSTATITIS: ICD-10-CM

## 2022-09-26 DIAGNOSIS — E29.1 HYPOGONADISM IN MALE: Primary | ICD-10-CM

## 2022-09-26 DIAGNOSIS — E78.00 HYPERCHOLESTEROLEMIA: ICD-10-CM

## 2022-09-26 PROCEDURE — 99214 OFFICE O/P EST MOD 30 MIN: CPT | Performed by: FAMILY MEDICINE

## 2022-09-26 NOTE — PROGRESS NOTES
[facility] PHYSICIAN SERVICES  [facility] FAMILY MEDICINE  [address] [address]  Dept: [phone]  Dept Fax: [phone]  Loc: [phone]    Felipe Lopez is a 47 y.o. male who presents today for his medical conditions/complaints as noted below. Fleipe Lopez is here for 3 Month Follow-Up        HPI:   CC: Here today to discuss the following:    Currently seeing urology for his persistent prostatitis. Is currently on Duexis for inflammation. He also has a history of chronic right knee pain. Has meniscal tear. Duexis appears to be helping that as well. Hypogonadism  Tolerating his current testosterone replacement therapy without adverse effects. He has noticed no symptoms of daytime somnolence consistent with sleep apnea. He has had no urinary symptoms. No mood disturbance. Overall satisfied with his current treatment  DENZEL on CPAP  Compliant with CPAP. No daytime somnolence. Feels rested for the most part when wearing CPAP. Depression with Anxiety  Compliant with medications. No adverse effects from medication. Depression and anxiety symptoms are stable today. No suicidal or homicidal ideation. Excessive worry, insomnia, and anxiousness are stable. Energy, concentration, and apathy are stable. Hypertension  Compliant with medications. No adverse effects from medication. No lightheadedness, palpitations, or chest pain. Gastroesophageal Reflux Disease  Symptoms currently under control. Medication adequately controls symptoms. No hematochezia or melena. Hyperlipidemia  Tolerating current cholesterol medication without side effects. . Attempting to reduce processed sugar and cholesterol from diet. HPI    Subjective:      Review of Systems  Review of Systems   Constitutional: Negative for chills and fever. HENT: Negative for congestion. Respiratory: Negative for cough, chest tightness and shortness of breath.   Cardiovascular: Negative for chest pain, palpitations and leg swelling. Gastrointestinal: Negative for abdominal pain, anal bleeding, constipation, diarrhea and nausea. Genitourinary: Negative for difficulty urinating. Psychiatric/Behavioral: Negative. SeeHPI for visit specific review of symptoms. All others negative      Objective:   /88   Pulse 86   Temp 98.2 °F (36.8 °C)   Wt 225 lb (102.1 kg)   SpO2 98%   BMI 34.21 kg/m²   Physical Exam  Physical Exam   Constitutional: He appears well-developed. Does not appear ill. Neck: Neck supple. No masses. Neck Symmetric. Normal tracheal position. No thyroid enlargement  Cardiovascular: Normal rate and regular rhythm. Exam reveals no friction rub. No murmur heard. Respiratory:  Effort normal and breath sounds normal. No respiratory distress. No wheezes. No rales. No use of accessory muscles or intercostal retractions. Neurological: alert. Psychiatric: normal mood and affect. His behavior is normal.       No results found for this or any previous visit (from the past 672 hour(s)). Didn't get his testosterone checked as ordered. Assessment & Plan: The following diagnoses and conditions are stable with no further orders unless indicated:  1. Hypogonadism in male  Lab Results   Component Value Date    TESTOSTERONE 1,021.0 (H) 06/06/2022     Lab Results   Component Value Date    WBC 7.2 06/06/2022    HGB 17.7 06/06/2022    HCT 53.9 (H) 06/06/2022    MCV 93.6 06/06/2022     06/06/2022     Lab Results   Component Value Date    PSA 0.78 12/06/2021    PSA 0.96 06/09/2020    PSA 0.95 07/11/2019   Recommend he get his testosterone level rechecked as soon as possible  Testosterone level is elevated and evidence of erythrocytosis. 2. DENZEL on CPAP  Compliant and satisfied with results. 3. Depression with anxiety  Current medications: Wellbutrin 150 mg daily  Lamictal 100 mg daily  Satisfied with current medication  4.  Essential (primary) hypertension  BP Readings from Last 3 Encounters:   09/26/22 118/88   06/24/22 (!) 142/94   06/06/22 (!) 138/92     Blood pressure stable  Ramipril 20 mg daily      5. Gastroesophageal reflux disease without esophagitis  Omeprazole 20 mg daily  Chronic issue stable  6. Acute prostatitis  Further input from urology    7. Hyperlipidemia:    Lab Results   Component Value Date    CHOL 160 06/06/2022    CHOL 230 (H) 12/06/2021    CHOL 214 (H) 02/18/2021     Lab Results   Component Value Date    TRIG 233 (H) 06/06/2022    TRIG 254 (H) 12/06/2021    TRIG 265 (H) 02/18/2021     Lab Results   Component Value Date    HDL 37 (L) 06/06/2022    HDL 39 (L) 12/06/2021    HDL 42 (L) 02/18/2021     Lab Results   Component Value Date    LDLCALC 76 06/06/2022    LDLCALC 140 12/06/2021    LDLCALC 119 02/18/2021     No results found for: LABVLDL, VLDL  No results found for: CHOLHDLRATIO  Excellent improvement with atorvastatin  We will recheck lipid panel next visit      8. Old tear of medial meniscus  Offered referral to orthopedist  States he will consider. Return in about 3 months (around 12/26/2022) for Routine follow up - 20 minutes. Discussed use, benefit, and side effects of prescribed medications. All patient questions answered. Pt voiced understanding. Reviewed health maintenance. Instructedto continue current medications, diet and exercise. Patient agreed with treatmentplan.  Follow up as directed.     _______________________________________________________________      Past Medical History:   Diagnosis Date    GERD (gastroesophageal reflux disease)     Hyperlipidemia     Hypertension     Kidney stone     Sleep apnea     cpap      Past Surgical History:   Procedure Laterality Date    CARPAL TUNNEL RELEASE Right     COLONOSCOPY N/A 06/06/2022    Dr Arelis Lambert, 5 yr recall    LITHOTRIPSY      multiple       Family History   Problem Relation Age of Onset    Diabetes Mother     High Blood Pressure Mother Diabetes Father     Heart Disease Father     High Cholesterol Father        Social History     Tobacco Use    Smoking status: Never    Smokeless tobacco: Never   Substance Use Topics    Alcohol use: No     Current Outpatient Medications   Medication Sig Dispense Refill    lamoTRIgine (LAMICTAL) 100 MG tablet TAKE 1 TABLET BY MOUTH EVERY DAY 30 tablet 11    testosterone cypionate (DEPOTESTOTERONE CYPIONATE) 200 MG/ML injection INJECT 1.5 ML IM EVERY 14 DAYS 10 mL 1    ramipril (ALTACE) 10 MG capsule TAKE 2 CAPSULES BY MOUTH EVERY DAY 60 capsule 5    atorvastatin (LIPITOR) 40 MG tablet Take 1 tablet by mouth daily 90 tablet 1    cetirizine (ZYRTEC) 10 MG tablet Take 10 mg by mouth daily      SYRINGE-NEEDLE, DISP, 3 ML (SAFETY SYRINGE/NEEDLE) 22G X 1-1/2\" 3 ML MISC Use one needle to administer testosterone every 2 weeks 12 each 3    SYRINGE-NEEDLE, DISP, 3 ML 18G X 1-1/2\" 3 ML MISC Use one syringe to draw testosterone every 2 weeks 12 each 3    buPROPion (WELLBUTRIN XL) 150 MG extended release tablet TAKE 1 TABLET BY MOUTH EVERY DAY IN THE MORNING (Patient taking differently: Take 150 mg by mouth every morning TAKE 1 TABLET BY MOUTH EVERY DAY IN THE MORNING) 90 tablet 3    hydroCHLOROthiazide (HYDRODIURIL) 25 MG tablet TAKE 1 TABLET BY MOUTH EVERY MORNING 30 tablet 5    CPAP Machine MISC CPAP and supplies 1 each 0    omeprazole (PRILOSEC) 20 MG delayed release capsule Take 1 capsule by mouth Daily 30 capsule 5    Testosterone Cypionate 200 MG/ML SOLN Inject as directed every 14 days       No current facility-administered medications for this visit.      Allergies   Allergen Reactions    Influenza Virus Vaccine Other (See Comments)     \"sick all winter\"    Azithromycin      Childhood allergy    Penicillins      Childhood allergy       Health Maintenance   Topic Date Due    COVID-19 Vaccine (1) Never done    HIV screen  Never done    Diabetes screen  Never done    Shingles vaccine (1 of 2) Never done    Depression Monitoring  03/24/2023    Lipids  06/06/2023    DTaP/Tdap/Td vaccine (2 - Td or Tdap) 12/20/2023    Colorectal Cancer Screen  06/06/2027    Hepatitis C screen  Completed    Hepatitis A vaccine  Aged Out    Hepatitis B vaccine  Aged Out    Hib vaccine  Aged Out    Meningococcal (ACWY) vaccine  Aged Out    Pneumococcal 0-64 years Vaccine  Aged Out       _______________________________________________________________    Note dictated using [id number] [facility]  Sometimes this dictation software makes erroneous transcriptions.

## 2022-10-03 ENCOUNTER — PATIENT MESSAGE (OUTPATIENT)
Dept: FAMILY MEDICINE CLINIC | Age: 52
End: 2022-10-03

## 2022-10-03 ENCOUNTER — TELEPHONE (OUTPATIENT)
Dept: FAMILY MEDICINE CLINIC | Age: 52
End: 2022-10-03

## 2022-10-03 NOTE — TELEPHONE ENCOUNTER
----- Message from Cristo Gallagher MD sent at 10/1/2022  7:43 AM CDT -----  Regarding: Lab appointment  He has difficulty getting his labs due to his current work schedule.   Are you aware of any labs that are open at 6 AM or after 5 PM?  Thanks

## 2022-10-03 NOTE — TELEPHONE ENCOUNTER
I called [address] [place] [address] and was told that they are open 24/7. If the patient comes in before 6 or after 5 he will need to go into the ED and register for his labs to be done. I sent the patient a my chart message letting him know and faxed the lab orders to the [facility] lab.

## 2022-10-11 LAB
BASOPHILS ABSOLUTE: NORMAL
BASOPHILS RELATIVE PERCENT: NORMAL
EOSINOPHILS ABSOLUTE: NORMAL
EOSINOPHILS RELATIVE PERCENT: NORMAL
HCT VFR BLD CALC: NORMAL %
HEMOGLOBIN: NORMAL
LYMPHOCYTES ABSOLUTE: NORMAL
LYMPHOCYTES RELATIVE PERCENT: NORMAL
MCH RBC QN AUTO: NORMAL PG
MCHC RBC AUTO-ENTMCNC: NORMAL G/DL
MCV RBC AUTO: NORMAL FL
MONOCYTES ABSOLUTE: NORMAL
MONOCYTES RELATIVE PERCENT: NORMAL
NEUTROPHILS ABSOLUTE: NORMAL
NEUTROPHILS RELATIVE PERCENT: NORMAL
PDW BLD-RTO: NORMAL %
PLATELET # BLD: NORMAL 10*3/UL
PMV BLD AUTO: NORMAL FL
RBC # BLD: NORMAL 10*6/UL
WBC # BLD: NORMAL 10*3/UL

## 2022-10-25 ENCOUNTER — OFFICE VISIT (OUTPATIENT)
Age: 52
End: 2022-10-25
Payer: COMMERCIAL

## 2022-10-25 VITALS
HEART RATE: 76 BPM | SYSTOLIC BLOOD PRESSURE: 116 MMHG | RESPIRATION RATE: 18 BRPM | WEIGHT: 225.2 LBS | DIASTOLIC BLOOD PRESSURE: 70 MMHG | TEMPERATURE: 97.9 F | OXYGEN SATURATION: 96 % | BODY MASS INDEX: 34.24 KG/M2

## 2022-10-25 DIAGNOSIS — J01.90 ACUTE SINUSITIS, RECURRENCE NOT SPECIFIED, UNSPECIFIED LOCATION: Primary | ICD-10-CM

## 2022-10-25 PROCEDURE — 96372 THER/PROPH/DIAG INJ SC/IM: CPT | Performed by: NURSE PRACTITIONER

## 2022-10-25 PROCEDURE — 99213 OFFICE O/P EST LOW 20 MIN: CPT | Performed by: NURSE PRACTITIONER

## 2022-10-25 RX ORDER — DEXAMETHASONE SODIUM PHOSPHATE 10 MG/ML
10 INJECTION INTRAMUSCULAR; INTRAVENOUS ONCE
Status: COMPLETED | OUTPATIENT
Start: 2022-10-25 | End: 2022-10-25

## 2022-10-25 RX ORDER — CEFDINIR 300 MG/1
300 CAPSULE ORAL 2 TIMES DAILY
Qty: 20 CAPSULE | Refills: 0 | Status: SHIPPED | OUTPATIENT
Start: 2022-10-25 | End: 2022-11-04

## 2022-10-25 RX ADMIN — DEXAMETHASONE SODIUM PHOSPHATE 10 MG: 10 INJECTION INTRAMUSCULAR; INTRAVENOUS at 13:23

## 2022-10-25 ASSESSMENT — ENCOUNTER SYMPTOMS
EYE PAIN: 0
ABDOMINAL PAIN: 0
SHORTNESS OF BREATH: 0
CHEST TIGHTNESS: 0
TROUBLE SWALLOWING: 0
ABDOMINAL DISTENTION: 0
SORE THROAT: 0
COUGH: 1
EYE DISCHARGE: 0
COLOR CHANGE: 0
WHEEZING: 0
STRIDOR: 0
SINUS PRESSURE: 1

## 2022-10-25 NOTE — PROGRESS NOTES
each 3    SYRINGE-NEEDLE, DISP, 3 ML 18G X 1-1/2\" 3 ML MISC Use one syringe to draw testosterone every 2 weeks 12 each 3    buPROPion (WELLBUTRIN XL) 150 MG extended release tablet TAKE 1 TABLET BY MOUTH EVERY DAY IN THE MORNING (Patient taking differently: Take 150 mg by mouth every morning TAKE 1 TABLET BY MOUTH EVERY DAY IN THE MORNING) 90 tablet 3    hydroCHLOROthiazide (HYDRODIURIL) 25 MG tablet TAKE 1 TABLET BY MOUTH EVERY MORNING 30 tablet 5    CPAP Machine MISC CPAP and supplies 1 each 0    omeprazole (PRILOSEC) 20 MG delayed release capsule Take 1 capsule by mouth Daily 30 capsule 5     Current Facility-Administered Medications   Medication Dose Route Frequency Provider Last Rate Last Admin    dexamethasone (DECADRON) injection 10 mg  10 mg IntraMUSCular Once ELLEN Hutchinson - JOE           Allergies   Allergen Reactions    Influenza Virus Vaccine Other (See Comments)     \"sick all winter\"    Azithromycin      Childhood allergy    Penicillins      Childhood allergy       Health Maintenance   Topic Date Due    COVID-19 Vaccine (1) Never done    HIV screen  Never done    Diabetes screen  Never done    Shingles vaccine (1 of 2) Never done    Depression Monitoring  03/24/2023    Lipids  06/06/2023    DTaP/Tdap/Td vaccine (2 - Td or Tdap) 12/20/2023    Colorectal Cancer Screen  06/06/2027    Hepatitis C screen  Completed    Hepatitis A vaccine  Aged Out    Hib vaccine  Aged Out    Meningococcal (ACWY) vaccine  Aged Out    Pneumococcal 0-64 years Vaccine  Aged Out       Subjective:   Review of Systems   Constitutional:  Negative for chills, fatigue and fever. HENT:  Positive for congestion and sinus pressure. Negative for sore throat and trouble swallowing. Eyes:  Negative for pain and discharge. Respiratory:  Positive for cough. Negative for chest tightness, shortness of breath, wheezing and stridor. Cardiovascular:  Negative for chest pain and palpitations.    Gastrointestinal:  Negative for abdominal distention and abdominal pain. Genitourinary:  Negative for difficulty urinating, dysuria and hematuria. Musculoskeletal:  Negative for arthralgias, neck pain and neck stiffness. Skin:  Negative for color change and rash. Neurological:  Negative for dizziness, syncope, speech difficulty, weakness and numbness. Psychiatric/Behavioral:  Negative for confusion and suicidal ideas. Objective    Physical Exam  Vitals and nursing note reviewed. Constitutional:       General: He is not in acute distress. Appearance: Normal appearance. HENT:      Head: Normocephalic. Right Ear: Tympanic membrane, ear canal and external ear normal.      Left Ear: Tympanic membrane, ear canal and external ear normal.      Nose: Nose normal. No congestion or rhinorrhea. Mouth/Throat:      Mouth: Mucous membranes are moist.      Pharynx: Oropharynx is clear. Posterior oropharyngeal erythema (mild) present. Eyes:      Conjunctiva/sclera: Conjunctivae normal.      Pupils: Pupils are equal, round, and reactive to light. Cardiovascular:      Rate and Rhythm: Normal rate and regular rhythm. Pulses: Normal pulses. Heart sounds: Normal heart sounds. No murmur heard. Pulmonary:      Effort: Pulmonary effort is normal. No respiratory distress. Breath sounds: Normal breath sounds. No stridor. No wheezing. Abdominal:      General: Abdomen is flat. Bowel sounds are normal. There is no distension. Tenderness: There is no abdominal tenderness. Musculoskeletal:         General: No swelling or deformity. Normal range of motion. Cervical back: Normal range of motion. No rigidity or tenderness. Skin:     General: Skin is warm and dry. Findings: No rash. Neurological:      General: No focal deficit present. Mental Status: He is alert and oriented to person, place, and time. Sensory: No sensory deficit.        /70   Pulse 76   Temp 97.9 °F (36.6 °C) (Temporal) Resp 18   Wt 225 lb 3.2 oz (102.2 kg)   SpO2 96%   BMI 34.24 kg/m²     Assessment         Diagnosis Orders   1. Acute sinusitis, recurrence not specified, unspecified location  cefdinir (OMNICEF) 300 MG capsule    dexamethasone (DECADRON) injection 10 mg          Plan   Encourage fluids, Tylenol/Ibuprofen, OTC decongestants   Dex IM   Antibiotic sent to pharmacy. If symptoms worsen or fail to improve follow-up with office or PCP  If SOB, chest pain, or high persistent fevers occur, go to ER    Patient verbalized understanding and agrees to plan    No orders of the defined types were placed in this encounter. No results found for this visit on 10/25/22. Orders Placed This Encounter   Medications    cefdinir (OMNICEF) 300 MG capsule     Sig: Take 1 capsule by mouth 2 times daily for 10 days     Dispense:  20 capsule     Refill:  0    dexamethasone (DECADRON) injection 10 mg      New Prescriptions    CEFDINIR (OMNICEF) 300 MG CAPSULE    Take 1 capsule by mouth 2 times daily for 10 days        No follow-ups on file. Discussed use, benefits, and side effects of any prescribed medications. All patient questions were answered. Patient voiced understanding of care plan. Patient was given educational materials - see patient instructions below. Patient Instructions   Encourage fluids, Tylenol/Ibuprofen, OTC decongestants   Dex IM   Antibiotic sent to pharmacy.   If symptoms worsen or fail to improve follow-up with office or PCP  If SOB, chest pain, or high persistent fevers occur, go to ER    Patient verbalized understanding and agrees to plan      Electronically signed by Edwin Garcia, APRN - CNP on 10/25/2022 at 1:24 PM

## 2022-12-06 DIAGNOSIS — E78.00 HYPERCHOLESTEROLEMIA: ICD-10-CM

## 2022-12-06 DIAGNOSIS — I10 ESSENTIAL (PRIMARY) HYPERTENSION: ICD-10-CM

## 2022-12-06 RX ORDER — RAMIPRIL 10 MG/1
CAPSULE ORAL
Qty: 60 CAPSULE | Refills: 5 | Status: SHIPPED | OUTPATIENT
Start: 2022-12-06

## 2022-12-06 RX ORDER — ATORVASTATIN CALCIUM 40 MG/1
TABLET, FILM COATED ORAL
Qty: 30 TABLET | Refills: 5 | Status: SHIPPED | OUTPATIENT
Start: 2022-12-06

## 2022-12-06 NOTE — TELEPHONE ENCOUNTER
Gwen Bonner called to request a refill on his medication.       Last office visit : 9/26/2022   Next office visit : 1/3/2023     Requested Prescriptions     Pending Prescriptions Disp Refills    ramipril (ALTACE) 10 MG capsule [Pharmacy Med Name: RAMIPRIL 10 MG CAPSULE] 60 capsule 5     Sig: TAKE 2 CAPSULES BY MOUTH EVERY DAY    atorvastatin (LIPITOR) 40 MG tablet [Pharmacy Med Name: ATORVASTATIN 40 MG TABLET] 30 tablet 5     Sig: TAKE 1 TABLET BY MOUTH EVERY DAY            Dora Mcelroy MA

## 2022-12-18 ENCOUNTER — OFFICE VISIT (OUTPATIENT)
Age: 52
End: 2022-12-18
Payer: COMMERCIAL

## 2022-12-18 VITALS
OXYGEN SATURATION: 98 % | BODY MASS INDEX: 34.3 KG/M2 | TEMPERATURE: 98.4 F | WEIGHT: 225.6 LBS | SYSTOLIC BLOOD PRESSURE: 118 MMHG | DIASTOLIC BLOOD PRESSURE: 80 MMHG | HEART RATE: 88 BPM

## 2022-12-18 DIAGNOSIS — R09.81 HEAD CONGESTION: ICD-10-CM

## 2022-12-18 DIAGNOSIS — J06.9 VIRAL URI: Primary | ICD-10-CM

## 2022-12-18 LAB
INFLUENZA A ANTIBODY: NEGATIVE
INFLUENZA B ANTIBODY: NEGATIVE

## 2022-12-18 PROCEDURE — 87804 INFLUENZA ASSAY W/OPTIC: CPT

## 2022-12-18 PROCEDURE — 99213 OFFICE O/P EST LOW 20 MIN: CPT

## 2022-12-18 RX ORDER — METHYLPREDNISOLONE 4 MG/1
TABLET ORAL
Qty: 1 KIT | Refills: 0 | Status: SHIPPED | OUTPATIENT
Start: 2022-12-18 | End: 2022-12-24

## 2022-12-18 ASSESSMENT — ENCOUNTER SYMPTOMS
SORE THROAT: 0
SINUS PRESSURE: 1

## 2022-12-18 NOTE — PATIENT INSTRUCTIONS
1. Steroid pack as directed  2. Hydrate with water or gatorade  3. OTC cough/cold medications are okay -follow label instructions (tylenol cold and flu severe or equivalent off brand, if high blood pressure use coricidin HBP)  4. Tylenol or motrin for pain or fever  5. Warm salt water gargles or warm liquids for comfort. Warm tea with a tablespoon of honey is excellent for sore throat. 6. Cool mist humidifer   7.  If symptoms worsen, please seek reevaluation

## 2022-12-18 NOTE — PROGRESS NOTES
[id number]  [address] [facility] [id number]  Dept: [phone]  Dept Fax: [phone]  Loc: [phone]    Maribel Gomez is a 47 y.o. male who presents today for his medical conditions/complaints as noted below. Maribel Gomez is c/o of Sinusitis and Congestion        HPI:     HPI  Maribel Gomez presents with complaints of sinus pressure and congestion. Denies fever, chills and body aches. Symptoms began 3 days ago. OTC treatment includes nyquil. Reports recent antibiotics and steroids in October for sinusitis. Denies recent covid19 infection. He is not vaccinated against covid19. Past Medical History:   Diagnosis Date    GERD (gastroesophageal reflux disease)     Hyperlipidemia     Hypertension     Kidney stone     Sleep apnea     cpap     Past Surgical History:   Procedure Laterality Date    CARPAL TUNNEL RELEASE Right     COLONOSCOPY N/A 06/06/2022    Dr Emily Kumar, 5 yr recall    LITHOTRIPSY      multiple       Family History   Problem Relation Age of Onset    Diabetes Mother     High Blood Pressure Mother     Diabetes Father     Heart Disease Father     High Cholesterol Father        Social History     Tobacco Use    Smoking status: Never    Smokeless tobacco: Never   Substance Use Topics    Alcohol use: No      Current Outpatient Medications   Medication Sig Dispense Refill    methylPREDNISolone (MEDROL DOSEPACK) 4 MG tablet Take by mouth.  1 kit 0    ramipril (ALTACE) 10 MG capsule TAKE 2 CAPSULES BY MOUTH EVERY DAY 60 capsule 5    atorvastatin (LIPITOR) 40 MG tablet TAKE 1 TABLET BY MOUTH EVERY DAY 30 tablet 5    lamoTRIgine (LAMICTAL) 100 MG tablet TAKE 1 TABLET BY MOUTH EVERY DAY 30 tablet 11    testosterone cypionate (DEPOTESTOTERONE CYPIONATE) 200 MG/ML injection INJECT 1.5 ML IM EVERY 14 DAYS 10 mL 1    Testosterone Cypionate 200 MG/ML SOLN Inject as directed every 14 days      cetirizine (ZYRTEC) 10 MG tablet Take 10 mg by mouth daily      SYRINGE-NEEDLE, DISP, 3 ML (SAFETY SYRINGE/NEEDLE) 22G X 1-1/2\" 3 ML MISC Use one needle to administer testosterone every 2 weeks 12 each 3    SYRINGE-NEEDLE, DISP, 3 ML 18G X 1-1/2\" 3 ML MISC Use one syringe to draw testosterone every 2 weeks 12 each 3    buPROPion (WELLBUTRIN XL) 150 MG extended release tablet TAKE 1 TABLET BY MOUTH EVERY DAY IN THE MORNING (Patient taking differently: Take 150 mg by mouth every morning TAKE 1 TABLET BY MOUTH EVERY DAY IN THE MORNING) 90 tablet 3    hydroCHLOROthiazide (HYDRODIURIL) 25 MG tablet TAKE 1 TABLET BY MOUTH EVERY MORNING 30 tablet 5    CPAP Machine MISC CPAP and supplies 1 each 0    omeprazole (PRILOSEC) 20 MG delayed release capsule Take 1 capsule by mouth Daily 30 capsule 5     No current facility-administered medications for this visit. Allergies   Allergen Reactions    Influenza Virus Vaccine Other (See Comments)     \"sick all winter\"    Azithromycin      Childhood allergy    Penicillins      Childhood allergy       Health Maintenance   Topic Date Due    COVID-19 Vaccine (1) Never done    HIV screen  Never done    Diabetes screen  Never done    Shingles vaccine (1 of 2) Never done    Depression Monitoring  03/24/2023    Lipids  06/06/2023    DTaP/Tdap/Td vaccine (2 - Td or Tdap) 12/20/2023    Colorectal Cancer Screen  06/06/2027    Hepatitis C screen  Completed    Hepatitis A vaccine  Aged Out    Hib vaccine  Aged Out    Meningococcal (ACWY) vaccine  Aged Out    Pneumococcal 0-64 years Vaccine  Aged Out       Subjective:     Review of Systems   Constitutional:  Negative for chills and fever. HENT:  Positive for congestion and sinus pressure. Negative for sore throat. Musculoskeletal:  Negative for myalgias.     :Objective      Physical Exam  Constitutional:       General: He is not in acute distress. Appearance: Normal appearance. He is ill-appearing. He is not toxic-appearing. HENT:      Head: Normocephalic and atraumatic. Right Ear: Tympanic membrane, ear canal and external ear normal.      Left Ear: Tympanic membrane, ear canal and external ear normal.      Nose: Congestion present. Mouth/Throat:      Mouth: Mucous membranes are moist.      Pharynx: Oropharynx is clear. Posterior oropharyngeal erythema present. No oropharyngeal exudate. Eyes:      General:         Right eye: No discharge. Left eye: No discharge. Conjunctiva/sclera: Conjunctivae normal.   Cardiovascular:      Rate and Rhythm: Normal rate and regular rhythm. Pulmonary:      Effort: Pulmonary effort is normal. No respiratory distress. Breath sounds: Normal breath sounds. Abdominal:      General: Abdomen is flat. Palpations: Abdomen is soft. Musculoskeletal:         General: Normal range of motion. Cervical back: Normal range of motion. Lymphadenopathy:      Cervical: No cervical adenopathy. Skin:     General: Skin is warm and dry. Capillary Refill: Capillary refill takes less than 2 seconds. Findings: No rash. Neurological:      General: No focal deficit present. Mental Status: He is alert. Psychiatric:         Mood and Affect: Mood normal.     /80 (Site: Left Upper Arm, Position: Sitting, Cuff Size: Large Adult)   Pulse 88   Temp 98.4 °F (36.9 °C) (Temporal)   Wt 225 lb 9.6 oz (102.3 kg)   SpO2 98%   BMI 34.30 kg/m²     :Assessment       Diagnosis Orders   1. Viral URI        2. Head congestion  POCT Influenza A/B          :Plan   Likely viral URI. Flu negative in office. Medrol pack for symptomatic support. Patient concerned he needs antibiotic- discussed still likely a viral source at this time, supportive care encouraged. He states several of his coworkers are also ill. Return precautions and home care education completed. Patient verbalized understanding.      Orders Placed This Encounter   Procedures    POCT Influenza A/B     Results for orders placed or performed in visit on 12/18/22 POCT Influenza A/B   Result Value Ref Range    Influenza A Ab negative     Influenza B Ab negative        No follow-ups on file. Orders Placed This Encounter   Medications    methylPREDNISolone (MEDROL DOSEPACK) 4 MG tablet     Sig: Take by mouth. Dispense:  1 kit     Refill:  0       Patient given educational materials- see patient instructions. Discussed use, benefit, and side effects of prescribed medications. All patient questions answered. Pt voiced understanding. Patient Instructions   1. Steroid pack as directed  2. Hydrate with water or gatorade  3. OTC cough/cold medications are okay -follow label instructions (tylenol cold and flu severe or equivalent off brand, if high blood pressure use coricidin HBP)  4. Tylenol or motrin for pain or fever  5. Warm salt water gargles or warm liquids for comfort. Warm tea with a tablespoon of honey is excellent for sore throat. 6. Cool mist humidifer   7.  If symptoms worsen, please seek reevaluation      Electronically signed by ELLEN Draper CNP on 12/18/2022 at 10:21 AM

## 2023-01-24 DIAGNOSIS — I10 ESSENTIAL (PRIMARY) HYPERTENSION: ICD-10-CM

## 2023-01-24 DIAGNOSIS — Z12.5 ENCOUNTER FOR PROSTATE CANCER SCREENING: Primary | ICD-10-CM

## 2023-01-24 DIAGNOSIS — E78.00 HYPERCHOLESTEROLEMIA: ICD-10-CM

## 2023-01-24 DIAGNOSIS — E29.1 HYPOGONADISM IN MALE: ICD-10-CM

## 2023-01-26 DIAGNOSIS — E29.1 HYPOGONADISM IN MALE: ICD-10-CM

## 2023-01-26 DIAGNOSIS — I10 ESSENTIAL (PRIMARY) HYPERTENSION: ICD-10-CM

## 2023-01-26 DIAGNOSIS — E78.00 HYPERCHOLESTEROLEMIA: ICD-10-CM

## 2023-01-26 DIAGNOSIS — Z12.5 ENCOUNTER FOR PROSTATE CANCER SCREENING: ICD-10-CM

## 2023-01-26 LAB
ALBUMIN SERPL-MCNC: 4.3 G/DL (ref 3.5–5.2)
ALP BLD-CCNC: 53 U/L (ref 40–130)
ALT SERPL-CCNC: 22 U/L (ref 5–41)
ANION GAP SERPL CALCULATED.3IONS-SCNC: 11 MMOL/L (ref 7–19)
AST SERPL-CCNC: 15 U/L (ref 5–40)
BASOPHILS ABSOLUTE: 0 K/UL (ref 0–0.2)
BASOPHILS RELATIVE PERCENT: 0.5 % (ref 0–1)
BILIRUB SERPL-MCNC: 0.5 MG/DL (ref 0.2–1.2)
BUN BLDV-MCNC: 20 MG/DL (ref 6–20)
CALCIUM SERPL-MCNC: 9 MG/DL (ref 8.6–10)
CHLORIDE BLD-SCNC: 99 MMOL/L (ref 98–111)
CHOLESTEROL, TOTAL: 161 MG/DL (ref 160–199)
CO2: 30 MMOL/L (ref 22–29)
CREAT SERPL-MCNC: 1.2 MG/DL (ref 0.5–1.2)
EOSINOPHILS ABSOLUTE: 0.1 K/UL (ref 0–0.6)
EOSINOPHILS RELATIVE PERCENT: 2 % (ref 0–5)
GFR SERPL CREATININE-BSD FRML MDRD: >60 ML/MIN/{1.73_M2}
GLUCOSE BLD-MCNC: 91 MG/DL (ref 74–109)
HCT VFR BLD CALC: 45 % (ref 42–52)
HDLC SERPL-MCNC: 42 MG/DL (ref 55–121)
HEMOGLOBIN: 15 G/DL (ref 14–18)
IMMATURE GRANULOCYTES #: 0 K/UL
LDL CHOLESTEROL CALCULATED: 84 MG/DL
LYMPHOCYTES ABSOLUTE: 2.4 K/UL (ref 1.1–4.5)
LYMPHOCYTES RELATIVE PERCENT: 38.1 % (ref 20–40)
MCH RBC QN AUTO: 31.6 PG (ref 27–31)
MCHC RBC AUTO-ENTMCNC: 33.3 G/DL (ref 33–37)
MCV RBC AUTO: 94.9 FL (ref 80–94)
MONOCYTES ABSOLUTE: 0.7 K/UL (ref 0–0.9)
MONOCYTES RELATIVE PERCENT: 10.6 % (ref 0–10)
NEUTROPHILS ABSOLUTE: 3.1 K/UL (ref 1.5–7.5)
NEUTROPHILS RELATIVE PERCENT: 48.5 % (ref 50–65)
PDW BLD-RTO: 13.1 % (ref 11.5–14.5)
PLATELET # BLD: 244 K/UL (ref 130–400)
PMV BLD AUTO: 9.7 FL (ref 9.4–12.4)
POTASSIUM SERPL-SCNC: 4.1 MMOL/L (ref 3.5–5)
PROSTATE SPECIFIC ANTIGEN: 0.73 NG/ML (ref 0–4)
RBC # BLD: 4.74 M/UL (ref 4.7–6.1)
SODIUM BLD-SCNC: 140 MMOL/L (ref 136–145)
TESTOSTERONE TOTAL: 877.9 NG/DL (ref 193–740)
TOTAL PROTEIN: 6.1 G/DL (ref 6.6–8.7)
TRIGL SERPL-MCNC: 175 MG/DL (ref 0–149)
WBC # BLD: 6.4 K/UL (ref 4.8–10.8)

## 2023-01-27 ENCOUNTER — OFFICE VISIT (OUTPATIENT)
Dept: PRIMARY CARE CLINIC | Age: 53
End: 2023-01-27

## 2023-01-27 VITALS
DIASTOLIC BLOOD PRESSURE: 94 MMHG | SYSTOLIC BLOOD PRESSURE: 132 MMHG | WEIGHT: 230 LBS | HEART RATE: 99 BPM | BODY MASS INDEX: 34.97 KG/M2 | OXYGEN SATURATION: 96 % | TEMPERATURE: 98.2 F

## 2023-01-27 DIAGNOSIS — E78.00 HYPERCHOLESTEROLEMIA: ICD-10-CM

## 2023-01-27 DIAGNOSIS — E29.1 HYPOGONADISM IN MALE: ICD-10-CM

## 2023-01-27 DIAGNOSIS — Z99.89 OSA ON CPAP: ICD-10-CM

## 2023-01-27 DIAGNOSIS — G47.33 OSA ON CPAP: ICD-10-CM

## 2023-01-27 DIAGNOSIS — I10 ESSENTIAL (PRIMARY) HYPERTENSION: Primary | ICD-10-CM

## 2023-01-27 DIAGNOSIS — K21.9 GASTROESOPHAGEAL REFLUX DISEASE WITHOUT ESOPHAGITIS: ICD-10-CM

## 2023-01-27 RX ORDER — AMLODIPINE BESYLATE 5 MG/1
5 TABLET ORAL DAILY
Qty: 30 TABLET | Refills: 5 | Status: SHIPPED | OUTPATIENT
Start: 2023-01-27

## 2023-01-27 RX ORDER — IBUPROFEN AND FAMOTIDINE 800; 26.6 MG/1; MG/1
1 TABLET, COATED ORAL 3 TIMES DAILY PRN
COMMUNITY
Start: 2023-01-03

## 2023-01-27 RX ORDER — TESTOSTERONE CYPIONATE 200 MG/ML
INJECTION INTRAMUSCULAR
Qty: 10 ML | Refills: 1 | Status: SHIPPED | OUTPATIENT
Start: 2023-01-27 | End: 2023-11-17

## 2023-01-27 ASSESSMENT — PATIENT HEALTH QUESTIONNAIRE - PHQ9
7. TROUBLE CONCENTRATING ON THINGS, SUCH AS READING THE NEWSPAPER OR WATCHING TELEVISION: 0
SUM OF ALL RESPONSES TO PHQ QUESTIONS 1-9: 0
SUM OF ALL RESPONSES TO PHQ QUESTIONS 1-9: 0
10. IF YOU CHECKED OFF ANY PROBLEMS, HOW DIFFICULT HAVE THESE PROBLEMS MADE IT FOR YOU TO DO YOUR WORK, TAKE CARE OF THINGS AT HOME, OR GET ALONG WITH OTHER PEOPLE: 0
SUM OF ALL RESPONSES TO PHQ QUESTIONS 1-9: 0
8. MOVING OR SPEAKING SO SLOWLY THAT OTHER PEOPLE COULD HAVE NOTICED. OR THE OPPOSITE, BEING SO FIGETY OR RESTLESS THAT YOU HAVE BEEN MOVING AROUND A LOT MORE THAN USUAL: 0
5. POOR APPETITE OR OVEREATING: 0
1. LITTLE INTEREST OR PLEASURE IN DOING THINGS: 0
3. TROUBLE FALLING OR STAYING ASLEEP: 0
6. FEELING BAD ABOUT YOURSELF - OR THAT YOU ARE A FAILURE OR HAVE LET YOURSELF OR YOUR FAMILY DOWN: 0
2. FEELING DOWN, DEPRESSED OR HOPELESS: 0
SUM OF ALL RESPONSES TO PHQ9 QUESTIONS 1 & 2: 0
SUM OF ALL RESPONSES TO PHQ QUESTIONS 1-9: 0
9. THOUGHTS THAT YOU WOULD BE BETTER OFF DEAD, OR OF HURTING YOURSELF: 0
4. FEELING TIRED OR HAVING LITTLE ENERGY: 0

## 2023-01-27 NOTE — PATIENT INSTRUCTIONS
How to Check Blood Pressure        _______________________________________________________________    DASH Diet: Care Instructions  Your Care Instructions    The DASH diet is an eating plan that can help lower your blood pressure. DASH stands for Dietary Approaches to Stop Hypertension. Hypertension is high blood pressure.  The DASH diet focuses on eating foods that are high in calcium, potassium, and magnesium. These nutrients can lower blood pressure. The foods that are highest in these nutrients are fruits, vegetables, low-fat dairy products, nuts, seeds, and legumes. But taking calcium, potassium, and magnesium supplements instead of eating foods that are high in those nutrients does not have the same effect. The DASH diet also includes whole grains, fish, and poultry.  The DASH diet is one of several lifestyle changes your doctor may recommend to lower your high blood pressure. Your doctor may also want you to decrease the amount of sodium in your diet. Lowering sodium while following the DASH diet can lower blood pressure even further than just the DASH diet alone.  Follow-up care is a key part of your treatment and safety. Be sure to make and go to all appointments, and call your doctor if you are having problems. It's also a good idea to know your test results and keep a list of the medicines you take.  How can you care for yourself at home?  Following the DASH diet  Eat 4 to 5 servings of fruit each day. A serving is 1 medium-sized piece of fruit, ½ cup chopped or canned fruit, 1/4 cup dried fruit, or 4 ounces (½ cup) of fruit juice. Choose fruit more often than fruit juice.  Eat 4 to 5 servings of vegetables each day. A serving is 1 cup of lettuce or raw leafy vegetables, ½ cup of chopped or cooked vegetables, or 4 ounces (½ cup) of vegetable juice. Choose vegetables more often than vegetable juice.  Get 2 to 3 servings of low-fat and fat-free dairy each day. A serving is 8 ounces of milk, 1 cup of yogurt,  or 1 ½ ounces of cheese. Eat 6 to 8 servings of grains each day. A serving is 1 slice of bread, 1 ounce of dry cereal, or ½ cup of cooked rice, pasta, or cooked cereal. Try to choose whole-grain products as much as possible. Limit lean meat, poultry, and fish to 2 servings each day. A serving is 3 ounces, about the size of a deck of cards. Eat 4 to 5 servings of nuts, seeds, and legumes (cooked dried beans, lentils, and split peas) each week. A serving is 1/3 cup of nuts, 2 tablespoons of seeds, or ½ cup of cooked beans or peas. Limit fats and oils to 2 to 3 servings each day. A serving is 1 teaspoon of vegetable oil or 2 tablespoons of salad dressing. Limit sweets and added sugars to 5 servings or less a week. A serving is 1 tablespoon jelly or jam, ½ cup sorbet, or 1 cup of lemonade. Eat less than 2,300 milligrams (mg) of sodium a day. If you limit your sodium to 1,500 mg a day, you can lower your blood pressure even more. Tips for success  Start small. Do not try to make dramatic changes to your diet all at once. You might feel that you are missing out on your favorite foods and then be more likely to not follow the plan. Make small changes, and stick with them. Once those changes become habit, add a few more changes. Try some of the following:  Make it a goal to eat a fruit or vegetable at every meal and at snacks. This will make it easy to get the recommended amount of fruits and vegetables each day. Try yogurt topped with fruit and nuts for a snack or healthy dessert. Add lettuce, tomato, cucumber, and onion to sandwiches. Combine a ready-made pizza crust with low-fat mozzarella cheese and lots of vegetable toppings. Try using tomatoes, squash, spinach, broccoli, carrots, cauliflower, and onions. Have a variety of cut-up vegetables with a low-fat dip as an appetizer instead of chips and dip. Sprinkle sunflower seeds or chopped almonds over salads.  Or try adding chopped walnuts or almonds to cooked vegetables. Try some vegetarian meals using beans and peas. Add garbanzo or kidney beans to salads. Make burritos and tacos with mashed gann beans or black beans. Where can you learn more? Go to https://kristeneb.healthLUVHAN. org and sign in to your [employer] account. Enter J674 in the [employer] box to learn more about \"DASH Diet: Care Instructions. \"     If you do not have an account, please click on the \"Sign Up Now\" link. Current as of: April 9, 2019  Content Version: 12.3  © 1274-5490 [employer]. Care instructions adapted under license by [facility] ([facility]). If you have questions about a medical condition or this instruction, always ask your healthcare professional. [address] any warranty or liability for your use of this information.

## 2023-01-27 NOTE — PROGRESS NOTES
[address] [facility]  [address] [address] [address]  [address] [address]  Dept: [phone]  Dept Fax: [phone]  Loc: [phone]    Ronny Rosa is a 47 y.o. male who presents today for his medical conditions/complaints as noted below. Ronny Rosa is here for 6 Month Follow-Up and Sinus Problem        HPI:   CC: Here today to discuss the following:    Right knee pain. Currently being followed by orthopedist.  [address] [place] [address] Right knee. DENZEL on CPAP  Compliant with CPAP. No daytime somnolence. Feels rested for the most part when wearing CPAP. Hypertension  Compliant with medications. No adverse effects from medication. No lightheadedness, palpitations, or chest pain. Hyperlipidemia  Tolerating current cholesterol medication without side effects. . Attempting to reduce processed sugar and cholesterol from diet. Gastroesophageal Reflux Disease  Symptoms currently under control. Medication adequately controls symptoms. No hematochezia or melena. HPI    Subjective:      Review of Systems  Review of Systems   Constitutional: Negative for chills and fever. HENT: Negative for congestion. Respiratory: Negative for cough, chest tightness and shortness of breath. Cardiovascular: Negative for chest pain, palpitations and leg swelling. Gastrointestinal: Negative for abdominal pain, anal bleeding, constipation, diarrhea and nausea. SeeHPI for visit specific review of symptoms. All others negative      Objective:   BP (!) 132/94   Pulse 99   Temp 98.2 °F (36.8 °C)   Wt 230 lb (104.3 kg)   SpO2 96%   BMI 34.97 kg/m²   Physical Exam  Physical Exam   Constitutional: He appears well. Does not appear ill. Neck: Neck supple. No masses. Neck Symmetric. Normal tracheal position. No thyroid enlargement  Cardiovascular: Normal rate and regular rhythm. Exam reveals no friction rub. No murmur heard.   Respiratory:  Effort normal and breath sounds normal. No respiratory distress. No wheezes. No rales. No use of accessory muscles or intercostal retractions.  Neurological: alert.   Psychiatric: normal mood and affect. His behavior is normal.       Recent Results (from the past 672 hour(s))   Testosterone    Collection Time: 01/26/23  9:10 AM   Result Value Ref Range    Testosterone 877.9 (H) 193.0 - 740.0 ng/dL   PSA Screening    Collection Time: 01/26/23  9:10 AM   Result Value Ref Range    PSA 0.73 0.00 - 4.00 ng/mL   Lipid Panel    Collection Time: 01/26/23  9:10 AM   Result Value Ref Range    Cholesterol, Total 161 160 - 199 mg/dL    Triglycerides 175 (H) 0 - 149 mg/dL    HDL 42 (L) 55 - 121 mg/dL    LDL Calculated 84 <100 mg/dL   Comprehensive Metabolic Panel    Collection Time: 01/26/23  9:10 AM   Result Value Ref Range    Sodium 140 136 - 145 mmol/L    Potassium 4.1 3.5 - 5.0 mmol/L    Chloride 99 98 - 111 mmol/L    CO2 30 (H) 22 - 29 mmol/L    Anion Gap 11 7 - 19 mmol/L    Glucose 91 74 - 109 mg/dL    BUN 20 6 - 20 mg/dL    Creatinine 1.2 0.5 - 1.2 mg/dL    Est, Glom Filt Rate >60 >60    Calcium 9.0 8.6 - 10.0 mg/dL    Total Protein 6.1 (L) 6.6 - 8.7 g/dL    Albumin 4.3 3.5 - 5.2 g/dL    Total Bilirubin 0.5 0.2 - 1.2 mg/dL    Alkaline Phosphatase 53 40 - 130 U/L    ALT 22 5 - 41 U/L    AST 15 5 - 40 U/L   CBC with Auto Differential    Collection Time: 01/26/23  9:10 AM   Result Value Ref Range    WBC 6.4 4.8 - 10.8 K/uL    RBC 4.74 4.70 - 6.10 M/uL    Hemoglobin 15.0 14.0 - 18.0 g/dL    Hematocrit 45.0 42.0 - 52.0 %    MCV 94.9 (H) 80.0 - 94.0 fL    MCH 31.6 (H) 27.0 - 31.0 pg    MCHC 33.3 33.0 - 37.0 g/dL    RDW 13.1 11.5 - 14.5 %    Platelets 244 130 - 400 K/uL    MPV 9.7 9.4 - 12.4 fL    Neutrophils % 48.5 (L) 50.0 - 65.0 %    Lymphocytes % 38.1 20.0 - 40.0 %    Monocytes % 10.6 (H) 0.0 - 10.0 %    Eosinophils % 2.0 0.0 - 5.0 %    Basophils % 0.5 0.0 - 1.0 %    Neutrophils Absolute 3.1 1.5 - 7.5 K/uL    Immature  Granulocytes # 0.0 K/uL    Lymphocytes Absolute 2.4 1.1 - 4.5 K/uL    Monocytes Absolute 0.70 0.00 - 0.90 K/uL    Eosinophils Absolute 0.10 0.00 - 0.60 K/uL    Basophils Absolute 0.00 0.00 - 0.20 K/uL               Assessment & Plan: The following diagnoses and conditions are stable with no further orders unless indicated:  1. Hypogonadism in male  Lab Results   Component Value Date    TESTOSTERONE 877.9 (H) 01/26/2023     Lab Results   Component Value Date    WBC 6.4 01/26/2023    HGB 15.0 01/26/2023    HCT 45.0 01/26/2023    MCV 94.9 (H) 01/26/2023     01/26/2023     Lab Results   Component Value Date    PSA 0.73 01/26/2023    PSA 0.78 12/06/2021    PSA 0.96 06/09/2020   Suggest reducing his testosterone. He is currently on 200 mg/mL: 1.5 mL every 14 days  Suggest reducing to 1.3 mL (260mg ) every 14 days. Recheck testosterone level in 3 months  - testosterone cypionate (DEPOTESTOTERONE CYPIONATE) 200 MG/ML injection; INJECT 1.5 ML IM EVERY 14 DAYS  Dispense: 10 mL; Refill: 1    2. Essential (primary) hypertension  Continue with ramipril and hydrochlorothiazide  Will add amlodipine  Reviewed DASH diet  Discussed diet exercise and weight loss    - amLODIPine (NORVASC) 5 MG tablet; Take 1 tablet by mouth daily  Dispense: 30 tablet; Refill: 5  Discussed risks and benefits of this new medication. Discussed potential side effects. He voiced understanding. 3. DENZEL on CPAP  Compliant and satisfied with results    4.  Hypercholesterolemia  Lab Results   Component Value Date    CHOL 161 01/26/2023    CHOL 160 06/06/2022    CHOL 230 (H) 12/06/2021     Lab Results   Component Value Date    TRIG 175 (H) 01/26/2023    TRIG 233 (H) 06/06/2022    TRIG 254 (H) 12/06/2021     Lab Results   Component Value Date    HDL 42 (L) 01/26/2023    HDL 37 (L) 06/06/2022    HDL 39 (L) 12/06/2021     Lab Results   Component Value Date    LDLCALC 84 01/26/2023    LDLCALC 76 06/06/2022    LDLCALC 140 12/06/2021     No results found for: LABVLDL, VLDL  No results found for: CHOLHDLRATIO  Cholesterol stable    5. Gastroesophageal reflux disease without esophagitis  Stable        No orders of the defined types were placed in this encounter. Return in about 3 months (around 4/27/2023) for Routine follow up - 20 minutes. Discussed use, benefit, and side effects of prescribed medications. All patient questions answered. Pt voiced understanding. Reviewed health maintenance. Instructedto continue current medications, diet and exercise. Patient agreed with treatmentplan.  Follow up as directed.     _______________________________________________________________      Past Medical History:   Diagnosis Date    GERD (gastroesophageal reflux disease)     Hyperlipidemia     Hypertension     Kidney stone     Sleep apnea     cpap      Past Surgical History:   Procedure Laterality Date    CARPAL TUNNEL RELEASE Right     COLONOSCOPY N/A 06/06/2022    Dr Jimi Dean, 5 yr recall    LITHOTRIPSY      multiple       Family History   Problem Relation Age of Onset    Diabetes Mother     High Blood Pressure Mother     Diabetes Father     Heart Disease Father     High Cholesterol Father        Social History     Tobacco Use    Smoking status: Never    Smokeless tobacco: Never   Substance Use Topics    Alcohol use: No     Current Outpatient Medications   Medication Sig Dispense Refill    DUEXIS 800-26.6 MG TABS Take 1 tablet by mouth 3 times daily as needed      testosterone cypionate (DEPOTESTOTERONE CYPIONATE) 200 MG/ML injection INJECT 1.5 ML IM EVERY 14 DAYS 10 mL 1    amLODIPine (NORVASC) 5 MG tablet Take 1 tablet by mouth daily 30 tablet 5    ramipril (ALTACE) 10 MG capsule TAKE 2 CAPSULES BY MOUTH EVERY DAY 60 capsule 5    atorvastatin (LIPITOR) 40 MG tablet TAKE 1 TABLET BY MOUTH EVERY DAY 30 tablet 5    lamoTRIgine (LAMICTAL) 100 MG tablet TAKE 1 TABLET BY MOUTH EVERY DAY 30 tablet 11    cetirizine (ZYRTEC) 10 MG tablet Take 10 mg by mouth daily      SYRINGE-NEEDLE, DISP, 3 ML (SAFETY SYRINGE/NEEDLE) 22G X 1-1/2\" 3 ML MISC Use one needle to administer testosterone every 2 weeks 12 each 3    SYRINGE-NEEDLE, DISP, 3 ML 18G X 1-1/2\" 3 ML MISC Use one syringe to draw testosterone every 2 weeks 12 each 3    buPROPion (WELLBUTRIN XL) 150 MG extended release tablet TAKE 1 TABLET BY MOUTH EVERY DAY IN THE MORNING (Patient taking differently: Take 150 mg by mouth every morning TAKE 1 TABLET BY MOUTH EVERY DAY IN THE MORNING) 90 tablet 3    hydroCHLOROthiazide (HYDRODIURIL) 25 MG tablet TAKE 1 TABLET BY MOUTH EVERY MORNING 30 tablet 5    CPAP Machine MISC CPAP and supplies 1 each 0    omeprazole (PRILOSEC) 20 MG delayed release capsule Take 1 capsule by mouth Daily 30 capsule 5    Testosterone Cypionate 200 MG/ML SOLN Inject as directed every 14 days       No current facility-administered medications for this visit. Allergies   Allergen Reactions    Influenza Virus Vaccine Other (See Comments)     \"sick all winter\"       Health Maintenance   Topic Date Due    COVID-19 Vaccine (1) Never done    HIV screen  Never done    Shingles vaccine (1 of 2) Never done    DTaP/Tdap/Td vaccine (2 - Td or Tdap) 12/20/2023    Lipids  01/26/2024    Depression Monitoring  01/27/2024    Colorectal Cancer Screen  06/06/2027    Hepatitis C screen  Completed    Hepatitis A vaccine  Aged Out    Hib vaccine  Aged Out    Meningococcal (ACWY) vaccine  Aged Out    Pneumococcal 0-64 years Vaccine  Aged Out       _______________________________________________________________    Note dictated using Dragon Dictation software  Sometimes this dictation software makes erroneous transcriptions.

## 2023-02-02 ENCOUNTER — PATIENT MESSAGE (OUTPATIENT)
Dept: PRIMARY CARE CLINIC | Age: 53
End: 2023-02-02

## 2023-02-02 DIAGNOSIS — E29.1 HYPOGONADISM IN MALE: ICD-10-CM

## 2023-02-02 RX ORDER — TESTOSTERONE CYPIONATE 200 MG/ML
INJECTION INTRAMUSCULAR
Qty: 10 ML | Refills: 1 | Status: SHIPPED | OUTPATIENT
Start: 2023-02-02 | End: 2023-11-23

## 2023-02-02 NOTE — TELEPHONE ENCOUNTER
From: Aide Méndez  To: Dr. Vu Zepeda: 2/2/2023 1:46 PM CST  Subject: Prescription for Testosterone Cypionate    Can you electronically send script for testosterone to [facility] pharmacy [address] They will not accept the written one that I have.  Thanks

## 2023-02-03 DIAGNOSIS — F41.8 DEPRESSION WITH ANXIETY: ICD-10-CM

## 2023-02-03 RX ORDER — BUPROPION HYDROCHLORIDE 150 MG/1
TABLET ORAL
Qty: 30 TABLET | Refills: 11 | Status: SHIPPED | OUTPATIENT
Start: 2023-02-03

## 2023-03-03 DIAGNOSIS — F41.8 DEPRESSION WITH ANXIETY: ICD-10-CM

## 2023-03-03 RX ORDER — BUPROPION HYDROCHLORIDE 150 MG/1
150 TABLET ORAL EVERY MORNING
Qty: 90 TABLET | Refills: 3 | Status: SHIPPED | OUTPATIENT
Start: 2023-03-03

## 2023-04-10 DIAGNOSIS — E78.00 HYPERCHOLESTEROLEMIA: ICD-10-CM

## 2023-04-10 DIAGNOSIS — I10 ESSENTIAL (PRIMARY) HYPERTENSION: ICD-10-CM

## 2023-04-11 RX ORDER — ATORVASTATIN CALCIUM 40 MG/1
40 TABLET, FILM COATED ORAL DAILY
Qty: 90 TABLET | Refills: 2 | Status: SHIPPED | OUTPATIENT
Start: 2023-04-11

## 2023-04-11 RX ORDER — RAMIPRIL 10 MG/1
20 CAPSULE ORAL DAILY
Qty: 180 CAPSULE | Refills: 2 | Status: SHIPPED | OUTPATIENT
Start: 2023-04-11

## 2023-04-27 ENCOUNTER — OFFICE VISIT (OUTPATIENT)
Dept: PRIMARY CARE CLINIC | Age: 53
End: 2023-04-27

## 2023-04-27 VITALS
WEIGHT: 225 LBS | TEMPERATURE: 97.8 F | OXYGEN SATURATION: 98 % | BODY MASS INDEX: 34.21 KG/M2 | DIASTOLIC BLOOD PRESSURE: 82 MMHG | HEART RATE: 80 BPM | SYSTOLIC BLOOD PRESSURE: 133 MMHG

## 2023-04-27 DIAGNOSIS — I10 ESSENTIAL (PRIMARY) HYPERTENSION: Primary | ICD-10-CM

## 2023-04-27 DIAGNOSIS — K21.9 GASTROESOPHAGEAL REFLUX DISEASE WITHOUT ESOPHAGITIS: ICD-10-CM

## 2023-04-27 DIAGNOSIS — F41.8 DEPRESSION WITH ANXIETY: ICD-10-CM

## 2023-04-27 DIAGNOSIS — E29.1 HYPOGONADISM IN MALE: ICD-10-CM

## 2023-04-27 DIAGNOSIS — E78.00 HYPERCHOLESTEROLEMIA: ICD-10-CM

## 2023-04-27 SDOH — ECONOMIC STABILITY: FOOD INSECURITY: WITHIN THE PAST 12 MONTHS, THE FOOD YOU BOUGHT JUST DIDN'T LAST AND YOU DIDN'T HAVE MONEY TO GET MORE.: NEVER TRUE

## 2023-04-27 SDOH — ECONOMIC STABILITY: INCOME INSECURITY: HOW HARD IS IT FOR YOU TO PAY FOR THE VERY BASICS LIKE FOOD, HOUSING, MEDICAL CARE, AND HEATING?: NOT HARD AT ALL

## 2023-04-27 SDOH — ECONOMIC STABILITY: FOOD INSECURITY: WITHIN THE PAST 12 MONTHS, YOU WORRIED THAT YOUR FOOD WOULD RUN OUT BEFORE YOU GOT MONEY TO BUY MORE.: NEVER TRUE

## 2023-04-27 SDOH — ECONOMIC STABILITY: HOUSING INSECURITY
IN THE LAST 12 MONTHS, WAS THERE A TIME WHEN YOU DID NOT HAVE A STEADY PLACE TO SLEEP OR SLEPT IN A SHELTER (INCLUDING NOW)?: NO

## 2023-04-27 NOTE — PROGRESS NOTES
[address] [facility]  [address] [address] [id number] [address] [address]  Dept: [phone]  Dept Fax: [phone]  Loc: [phone]    Aditya Guzman is a 47 y.o. male who presents today for his medical conditions/complaints as noted below. Aditya Guzman is here for 3 Month Follow-Up        HPI:   CC: Here today to discuss the following:    Since his last visit he has had a right meniscal tear repair at the orthopedic [facility] by Dr. Afia Lorenz. Hypertension  Compliant with medications. No adverse effects from medication. No lightheadedness, palpitations, or chest pain. Hyperlipidemia  Tolerating current cholesterol medication without side effects. . Attempting to reduce processed sugar and cholesterol from diet. Depression with Anxiety  Compliant with medications. No adverse effects from medication. Depression and anxiety symptoms are stable today. No suicidal or homicidal ideation. Excessive worry, insomnia, and anxiousness are stable. Energy, concentration, and apathy are stable. Gastroesophageal Reflux Disease  Symptoms currently under control. Medication adequately controls symptoms. No hematochezia or melena. Hypogonadism  Tolerating his current testosterone replacement therapy without adverse effects. He has noticed no symptoms of daytime somnolence consistent with sleep apnea. He has had no urinary symptoms. No mood disturbance. Overall satisfied with his current treatment        HPI    Subjective:      Review of Systems  Review of Systems   Constitutional: Negative for chills and fever. HENT: Negative for congestion. Respiratory: Negative for cough, chest tightness and shortness of breath. Cardiovascular: Negative for chest pain, palpitations and leg swelling. Gastrointestinal: Negative for abdominal pain, anal bleeding, constipation, diarrhea and nausea. SeeHPI for visit specific review of symptoms.   All others

## 2023-04-27 NOTE — PATIENT INSTRUCTIONS
the vaccine. Contact your insurer to find out. Vaccine assistance programs  Some pharmaceutical companies provide vaccines to eligible adults who cannot afford them. You may want to check with the vaccine , [employer], about [employer].

## 2023-07-10 RX ORDER — LAMOTRIGINE 100 MG/1
TABLET ORAL
Qty: 90 TABLET | Refills: 3 | Status: SHIPPED | OUTPATIENT
Start: 2023-07-10

## 2023-07-25 DIAGNOSIS — E29.1 HYPOGONADISM IN MALE: ICD-10-CM

## 2023-07-25 DIAGNOSIS — I10 ESSENTIAL (PRIMARY) HYPERTENSION: ICD-10-CM

## 2023-07-25 LAB
ALBUMIN SERPL-MCNC: 4.4 G/DL (ref 3.5–5.2)
ALP SERPL-CCNC: 65 U/L (ref 40–130)
ALT SERPL-CCNC: 17 U/L (ref 5–41)
ANION GAP SERPL CALCULATED.3IONS-SCNC: 9 MMOL/L (ref 7–19)
AST SERPL-CCNC: 16 U/L (ref 5–40)
BASOPHILS # BLD: 0 K/UL (ref 0–0.2)
BASOPHILS NFR BLD: 0.8 % (ref 0–1)
BILIRUB SERPL-MCNC: 0.6 MG/DL (ref 0.2–1.2)
BUN SERPL-MCNC: 16 MG/DL (ref 6–20)
CALCIUM SERPL-MCNC: 8.9 MG/DL (ref 8.6–10)
CHLORIDE SERPL-SCNC: 103 MMOL/L (ref 98–111)
CO2 SERPL-SCNC: 29 MMOL/L (ref 22–29)
CREAT SERPL-MCNC: 1.2 MG/DL (ref 0.5–1.2)
EOSINOPHIL # BLD: 0.1 K/UL (ref 0–0.6)
EOSINOPHIL NFR BLD: 2.8 % (ref 0–5)
ERYTHROCYTE [DISTWIDTH] IN BLOOD BY AUTOMATED COUNT: 13.2 % (ref 11.5–14.5)
GLUCOSE SERPL-MCNC: 105 MG/DL (ref 74–109)
HCT VFR BLD AUTO: 42.3 % (ref 42–52)
HGB BLD-MCNC: 14.2 G/DL (ref 14–18)
IMM GRANULOCYTES # BLD: 0 K/UL
LYMPHOCYTES # BLD: 1.6 K/UL (ref 1.1–4.5)
LYMPHOCYTES NFR BLD: 32.1 % (ref 20–40)
MCH RBC QN AUTO: 31.5 PG (ref 27–31)
MCHC RBC AUTO-ENTMCNC: 33.6 G/DL (ref 33–37)
MCV RBC AUTO: 93.8 FL (ref 80–94)
MONOCYTES # BLD: 0.6 K/UL (ref 0–0.9)
MONOCYTES NFR BLD: 11.6 % (ref 0–10)
NEUTROPHILS # BLD: 2.6 K/UL (ref 1.5–7.5)
NEUTS SEG NFR BLD: 52.5 % (ref 50–65)
PLATELET # BLD AUTO: 219 K/UL (ref 130–400)
PMV BLD AUTO: 9.5 FL (ref 9.4–12.4)
POTASSIUM SERPL-SCNC: 4 MMOL/L (ref 3.5–5)
PROT SERPL-MCNC: 6.7 G/DL (ref 6.6–8.7)
RBC # BLD AUTO: 4.51 M/UL (ref 4.7–6.1)
SODIUM SERPL-SCNC: 141 MMOL/L (ref 136–145)
TESTOST SERPL-MCNC: 573.4 NG/DL (ref 193–740)
WBC # BLD AUTO: 5 K/UL (ref 4.8–10.8)

## 2023-07-28 ENCOUNTER — OFFICE VISIT (OUTPATIENT)
Dept: PRIMARY CARE CLINIC | Age: 53
End: 2023-07-28

## 2023-07-28 VITALS
SYSTOLIC BLOOD PRESSURE: 134 MMHG | BODY MASS INDEX: 34.07 KG/M2 | DIASTOLIC BLOOD PRESSURE: 82 MMHG | HEART RATE: 68 BPM | WEIGHT: 224.8 LBS | TEMPERATURE: 98 F | OXYGEN SATURATION: 97 % | HEIGHT: 68 IN

## 2023-07-28 DIAGNOSIS — I10 ESSENTIAL (PRIMARY) HYPERTENSION: Primary | ICD-10-CM

## 2023-07-28 DIAGNOSIS — G47.33 OSA ON CPAP: ICD-10-CM

## 2023-07-28 DIAGNOSIS — Z12.5 ENCOUNTER FOR PROSTATE CANCER SCREENING: ICD-10-CM

## 2023-07-28 DIAGNOSIS — E78.00 HYPERCHOLESTEROLEMIA: ICD-10-CM

## 2023-07-28 DIAGNOSIS — Z99.89 OSA ON CPAP: ICD-10-CM

## 2023-07-28 DIAGNOSIS — E29.1 HYPOGONADISM IN MALE: ICD-10-CM

## 2023-07-28 DIAGNOSIS — R53.83 OTHER FATIGUE: ICD-10-CM

## 2023-07-28 DIAGNOSIS — F41.8 DEPRESSION WITH ANXIETY: ICD-10-CM

## 2023-07-28 DIAGNOSIS — K21.9 GASTROESOPHAGEAL REFLUX DISEASE WITHOUT ESOPHAGITIS: ICD-10-CM

## 2023-07-28 RX ORDER — TAMSULOSIN HYDROCHLORIDE 0.4 MG/1
0.4 CAPSULE ORAL NIGHTLY
Qty: 30 CAPSULE | Refills: 11 | COMMUNITY
Start: 2023-07-24 | End: 2024-07-19

## 2023-07-28 NOTE — PROGRESS NOTES
Never   Substance Use Topics    Alcohol use: No        _______________________________________________________________    Note dictated using Dragon Dictation software  Sometimes this dictation software makes erroneous transcriptions.

## 2023-08-07 DIAGNOSIS — E29.1 HYPOGONADISM IN MALE: ICD-10-CM

## 2023-08-07 RX ORDER — TESTOSTERONE CYPIONATE 200 MG/ML
INJECTION, SOLUTION INTRAMUSCULAR
Qty: 10 ML | Refills: 5 | Status: SHIPPED | OUTPATIENT
Start: 2023-08-07 | End: 2023-09-07

## 2023-09-09 DIAGNOSIS — F41.8 DEPRESSION WITH ANXIETY: ICD-10-CM

## 2023-09-10 DIAGNOSIS — I10 ESSENTIAL (PRIMARY) HYPERTENSION: ICD-10-CM

## 2023-09-10 DIAGNOSIS — E29.1 HYPOGONADISM IN MALE: ICD-10-CM

## 2023-09-11 RX ORDER — BUPROPION HYDROCHLORIDE 150 MG/1
150 TABLET ORAL EVERY MORNING
Qty: 90 TABLET | Refills: 3 | OUTPATIENT
Start: 2023-09-11

## 2023-09-11 RX ORDER — AMLODIPINE BESYLATE 5 MG/1
5 TABLET ORAL DAILY
Qty: 90 TABLET | Refills: 3 | Status: SHIPPED | OUTPATIENT
Start: 2023-09-11

## 2023-09-12 RX ORDER — AMLODIPINE BESYLATE 5 MG/1
5 TABLET ORAL DAILY
Qty: 30 TABLET | Refills: 5 | OUTPATIENT
Start: 2023-09-12

## 2023-12-10 DIAGNOSIS — I10 ESSENTIAL (PRIMARY) HYPERTENSION: ICD-10-CM

## 2023-12-11 RX ORDER — RAMIPRIL 10 MG/1
20 CAPSULE ORAL DAILY
Qty: 180 CAPSULE | Refills: 2 | Status: SHIPPED | OUTPATIENT
Start: 2023-12-11

## 2024-01-04 RX ORDER — LAMOTRIGINE 100 MG/1
100 TABLET ORAL DAILY
Qty: 90 TABLET | Refills: 3 | OUTPATIENT
Start: 2024-01-04

## 2024-01-08 DIAGNOSIS — E29.1 HYPOGONADISM IN MALE: ICD-10-CM

## 2024-01-09 ENCOUNTER — OFFICE VISIT (OUTPATIENT)
Age: 54
End: 2024-01-09
Payer: COMMERCIAL

## 2024-01-09 VITALS
OXYGEN SATURATION: 97 % | WEIGHT: 220 LBS | DIASTOLIC BLOOD PRESSURE: 80 MMHG | TEMPERATURE: 98.2 F | BODY MASS INDEX: 33.34 KG/M2 | RESPIRATION RATE: 20 BRPM | HEIGHT: 68 IN | SYSTOLIC BLOOD PRESSURE: 118 MMHG | HEART RATE: 93 BPM

## 2024-01-09 DIAGNOSIS — H66.92 ACUTE OTITIS MEDIA, LEFT: ICD-10-CM

## 2024-01-09 DIAGNOSIS — J01.90 ACUTE RHINOSINUSITIS: Primary | ICD-10-CM

## 2024-01-09 DIAGNOSIS — R09.81 SINUS CONGESTION: ICD-10-CM

## 2024-01-09 DIAGNOSIS — R05.1 ACUTE COUGH: ICD-10-CM

## 2024-01-09 LAB
INFLUENZA A ANTIBODY: NORMAL
INFLUENZA B ANTIBODY: NORMAL
S PYO AG THROAT QL: NORMAL
SARS-COV-2 N GENE RESP QL NAA+PROBE: DETECTED

## 2024-01-09 PROCEDURE — 99214 OFFICE O/P EST MOD 30 MIN: CPT

## 2024-01-09 RX ORDER — AMOXICILLIN AND CLAVULANATE POTASSIUM 500; 125 MG/1; MG/1
1 TABLET, FILM COATED ORAL 2 TIMES DAILY WITH MEALS
Qty: 20 TABLET | Refills: 0 | Status: SHIPPED | OUTPATIENT
Start: 2024-01-09 | End: 2024-01-19

## 2024-01-09 RX ORDER — BROMPHENIRAMINE MALEATE, PSEUDOEPHEDRINE HYDROCHLORIDE, AND DEXTROMETHORPHAN HYDROBROMIDE 2; 30; 10 MG/5ML; MG/5ML; MG/5ML
5-10 SYRUP ORAL 4 TIMES DAILY PRN
Qty: 200 ML | Refills: 0 | Status: SHIPPED | OUTPATIENT
Start: 2024-01-09 | End: 2024-01-14

## 2024-01-09 ASSESSMENT — ENCOUNTER SYMPTOMS
EYE DISCHARGE: 0
CONSTIPATION: 0
EYE PAIN: 0
STRIDOR: 0
EYE REDNESS: 0
VOMITING: 0
ABDOMINAL PAIN: 0
TROUBLE SWALLOWING: 0
NAUSEA: 0
COLOR CHANGE: 0
ABDOMINAL DISTENTION: 0
SORE THROAT: 0
CHEST TIGHTNESS: 0
SINUS PAIN: 0
COUGH: 1
APNEA: 0
SINUS PRESSURE: 1
WHEEZING: 0
SHORTNESS OF BREATH: 0
CHOKING: 0
FACIAL SWELLING: 0
DIARRHEA: 0

## 2024-01-09 NOTE — PROGRESS NOTES
[facility]  [facility]  [address] [place] [id number]  Dept: [phone]  Dept Fax: [phone]  Loc: [phone]    Pino Crooks is a 53 y.o. male who presents today for his medical conditions/complaints as noted below.  Pino Crooks is complaining of sinus congestion        HPI:   Patient presents to the clinic today with complaints of sinus congestion, sinus pressure, cough for 3 to 4 days.  Denies fever, body aches, chills.  Patient is taking over-the-counter medications without any relief.  Symptoms are moderate.  Denies known exposure to sick contacts.  Patient is stable        Past Medical History:   Diagnosis Date    GERD (gastroesophageal reflux disease)     Hyperlipidemia     Hypertension     Kidney stone     Sleep apnea     cpap       Past Surgical History:   Procedure Laterality Date    CARPAL TUNNEL RELEASE Right     COLONOSCOPY N/A 06/06/2022    Dr [facility], 5 yr recall    LITHOTRIPSY      multiple       Family History   Problem Relation Age of Onset    Diabetes Mother     High Blood Pressure Mother     Diabetes Father     Heart Disease Father     High Cholesterol Father        Social History     Tobacco Use    Smoking status: Never    Smokeless tobacco: Never   Substance Use Topics    Alcohol use: No        Current Outpatient Medications   Medication Sig Dispense Refill    amoxicillin-clavulanate (AUGMENTIN) 500-125 MG per tablet Take 1 tablet by mouth 2 times daily (with meals) for 10 days 20 tablet 0    brompheniramine-pseudoephedrine-DM 2-30-10 MG/5ML syrup Take 5-10 mLs by mouth 4 times daily as needed for Cough 200 mL 0    atorvastatin (LIPITOR) 40 MG tablet TAKE 1 TABLET BY MOUTH EVERY DAY 90 tablet 2    ramipril (ALTACE) 10 MG capsule TAKE 2 CAPSULES BY MOUTH EVERY  capsule 2    SYRINGE-NEEDLE, DISP, 3 ML (SAFETY SYRINGE/NEEDLE) 22G X 1-1/2\" 3 ML MISC Use one needle to administer testosterone every 2 weeks 6 each 3

## 2024-01-09 NOTE — PATIENT INSTRUCTIONS
Strep and flu tests were negative    COVID test sent to lab. We will call with results    Augmentin prescribed. Take full course of antibiotics    Bromfed as needed for cough    Increase fluid intake    Recommended OTC mucinex     May use OTC claritin/zyrtec and nasal spray such as flonase to help symptoms    If patient is not improving or developing any new/worsening symptoms then return to clinic or f/u with PCP    Any condition can change, despite proper treatment. Therefore, if symptoms still persist or worsen after treatment plan intitated today, either go to the nearest ER, or call PCP, or return to UC for further evaluation.    Urgent Care evaluation today is not a substitute for PCP visit. Follow up care is your responsibility to discuss and review this UC visit.

## 2024-01-23 DIAGNOSIS — Z12.5 ENCOUNTER FOR PROSTATE CANCER SCREENING: ICD-10-CM

## 2024-01-23 DIAGNOSIS — E29.1 HYPOGONADISM IN MALE: ICD-10-CM

## 2024-01-23 DIAGNOSIS — R53.83 OTHER FATIGUE: ICD-10-CM

## 2024-01-23 DIAGNOSIS — E78.00 HYPERCHOLESTEROLEMIA: ICD-10-CM

## 2024-01-23 LAB
ALBUMIN SERPL-MCNC: 4.3 G/DL (ref 3.5–5.2)
ALP SERPL-CCNC: 68 U/L (ref 40–130)
ALT SERPL-CCNC: 25 U/L (ref 5–41)
ANION GAP SERPL CALCULATED.3IONS-SCNC: 13 MMOL/L (ref 7–19)
AST SERPL-CCNC: 18 U/L (ref 5–40)
BASOPHILS # BLD: 0 K/UL (ref 0–0.2)
BASOPHILS NFR BLD: 0.7 % (ref 0–1)
BILIRUB SERPL-MCNC: 0.5 MG/DL (ref 0.2–1.2)
BUN SERPL-MCNC: 14 MG/DL (ref 6–20)
CALCIUM SERPL-MCNC: 9.2 MG/DL (ref 8.6–10)
CHLORIDE SERPL-SCNC: 101 MMOL/L (ref 98–111)
CHOLEST SERPL-MCNC: 160 MG/DL (ref 160–199)
CO2 SERPL-SCNC: 26 MMOL/L (ref 22–29)
CREAT SERPL-MCNC: 1.1 MG/DL (ref 0.5–1.2)
EOSINOPHIL # BLD: 0.1 K/UL (ref 0–0.6)
EOSINOPHIL NFR BLD: 2.4 % (ref 0–5)
ERYTHROCYTE [DISTWIDTH] IN BLOOD BY AUTOMATED COUNT: 13 % (ref 11.5–14.5)
GLUCOSE SERPL-MCNC: 105 MG/DL (ref 74–109)
HCT VFR BLD AUTO: 43.7 % (ref 42–52)
HDLC SERPL-MCNC: 42 MG/DL (ref 55–121)
HGB BLD-MCNC: 14.7 G/DL (ref 14–18)
IMM GRANULOCYTES # BLD: 0 K/UL
LDLC SERPL CALC-MCNC: 76 MG/DL
LYMPHOCYTES # BLD: 2 K/UL (ref 1.1–4.5)
LYMPHOCYTES NFR BLD: 33.7 % (ref 20–40)
MCH RBC QN AUTO: 32.1 PG (ref 27–31)
MCHC RBC AUTO-ENTMCNC: 33.6 G/DL (ref 33–37)
MCV RBC AUTO: 95.4 FL (ref 80–94)
MONOCYTES # BLD: 0.7 K/UL (ref 0–0.9)
MONOCYTES NFR BLD: 11.3 % (ref 0–10)
NEUTROPHILS # BLD: 3.1 K/UL (ref 1.5–7.5)
NEUTS SEG NFR BLD: 51.6 % (ref 50–65)
PLATELET # BLD AUTO: 236 K/UL (ref 130–400)
PMV BLD AUTO: 10 FL (ref 9.4–12.4)
POTASSIUM SERPL-SCNC: 3.9 MMOL/L (ref 3.5–5)
PROT SERPL-MCNC: 6.6 G/DL (ref 6.6–8.7)
PSA SERPL-MCNC: 0.75 NG/ML (ref 0–4)
RBC # BLD AUTO: 4.58 M/UL (ref 4.7–6.1)
SODIUM SERPL-SCNC: 140 MMOL/L (ref 136–145)
TESTOST SERPL-MCNC: 827.6 NG/DL (ref 193–740)
TRIGL SERPL-MCNC: 209 MG/DL (ref 0–149)
WBC # BLD AUTO: 5.9 K/UL (ref 4.8–10.8)

## 2024-01-30 ENCOUNTER — OFFICE VISIT (OUTPATIENT)
Dept: PRIMARY CARE CLINIC | Age: 54
End: 2024-01-30
Payer: COMMERCIAL

## 2024-01-30 VITALS
SYSTOLIC BLOOD PRESSURE: 134 MMHG | TEMPERATURE: 98.3 F | BODY MASS INDEX: 34.67 KG/M2 | DIASTOLIC BLOOD PRESSURE: 72 MMHG | HEART RATE: 105 BPM | WEIGHT: 228 LBS | OXYGEN SATURATION: 96 %

## 2024-01-30 DIAGNOSIS — E29.1 HYPOGONADISM IN MALE: ICD-10-CM

## 2024-01-30 DIAGNOSIS — G47.33 OSA ON CPAP: ICD-10-CM

## 2024-01-30 DIAGNOSIS — K21.9 GASTROESOPHAGEAL REFLUX DISEASE WITHOUT ESOPHAGITIS: ICD-10-CM

## 2024-01-30 DIAGNOSIS — Z51.81 MEDICATION MONITORING ENCOUNTER: ICD-10-CM

## 2024-01-30 DIAGNOSIS — E78.00 HYPERCHOLESTEROLEMIA: ICD-10-CM

## 2024-01-30 DIAGNOSIS — R53.83 OTHER FATIGUE: ICD-10-CM

## 2024-01-30 DIAGNOSIS — F41.8 DEPRESSION WITH ANXIETY: ICD-10-CM

## 2024-01-30 DIAGNOSIS — I10 ESSENTIAL (PRIMARY) HYPERTENSION: Primary | ICD-10-CM

## 2024-01-30 PROCEDURE — 3078F DIAST BP <80 MM HG: CPT | Performed by: FAMILY MEDICINE

## 2024-01-30 PROCEDURE — 3075F SYST BP GE 130 - 139MM HG: CPT | Performed by: FAMILY MEDICINE

## 2024-01-30 PROCEDURE — 99214 OFFICE O/P EST MOD 30 MIN: CPT | Performed by: FAMILY MEDICINE

## 2024-01-30 RX ORDER — TESTOSTERONE CYPIONATE 200 MG/ML
INJECTION, SOLUTION INTRAMUSCULAR
Qty: 10 ML | Refills: 5 | Status: SHIPPED | OUTPATIENT
Start: 2024-01-30 | End: 2024-07-24

## 2024-01-30 ASSESSMENT — PATIENT HEALTH QUESTIONNAIRE - PHQ9
7. TROUBLE CONCENTRATING ON THINGS, SUCH AS READING THE NEWSPAPER OR WATCHING TELEVISION: 0
SUM OF ALL RESPONSES TO PHQ QUESTIONS 1-9: 2
2. FEELING DOWN, DEPRESSED OR HOPELESS: 0
SUM OF ALL RESPONSES TO PHQ QUESTIONS 1-9: 2
SUM OF ALL RESPONSES TO PHQ QUESTIONS 1-9: 2
6. FEELING BAD ABOUT YOURSELF - OR THAT YOU ARE A FAILURE OR HAVE LET YOURSELF OR YOUR FAMILY DOWN: 0
8. MOVING OR SPEAKING SO SLOWLY THAT OTHER PEOPLE COULD HAVE NOTICED. OR THE OPPOSITE, BEING SO FIGETY OR RESTLESS THAT YOU HAVE BEEN MOVING AROUND A LOT MORE THAN USUAL: 0
10. IF YOU CHECKED OFF ANY PROBLEMS, HOW DIFFICULT HAVE THESE PROBLEMS MADE IT FOR YOU TO DO YOUR WORK, TAKE CARE OF THINGS AT HOME, OR GET ALONG WITH OTHER PEOPLE: 0
5. POOR APPETITE OR OVEREATING: 0
SUM OF ALL RESPONSES TO PHQ QUESTIONS 1-9: 2
SUM OF ALL RESPONSES TO PHQ9 QUESTIONS 1 & 2: 0
9. THOUGHTS THAT YOU WOULD BE BETTER OFF DEAD, OR OF HURTING YOURSELF: 0
3. TROUBLE FALLING OR STAYING ASLEEP: 0
4. FEELING TIRED OR HAVING LITTLE ENERGY: 2
1. LITTLE INTEREST OR PLEASURE IN DOING THINGS: 0

## 2024-01-30 NOTE — PROGRESS NOTES
[facility]  [address]  [address]  [place] [address]  Dept: [phone]  Dept Fax: [phone]  Loc: [phone]    Pino Crooks is a 53 y.o. male who presents today for his medical conditions/complaints as noted below.  Pino Crooks is here for 6 Month Follow-Up and Fatigue        Subjective:   CC:  Here today to discuss the following:    -     Hypertension  Compliant with medications.  No adverse effects from medication.  No lightheadedness, palpitations, or chest pain.    Depression with Anxiety  Compliant with medications.  No adverse effects from medication.  Depression and anxiety symptoms are stable today. No suicidal or homicidal ideation. Excessive worry, insomnia, and anxiousness are stable. Energy, concentration, and apathy are stable.  DENZEL on CPAP  -States he would like to speak to a sleep specialist regarding his CPAP  Gastroesophageal Reflux Disease  Symptoms currently under control.   Medication adequately controls symptoms.  No hematochezia or melena.     Hypogonadism  Tolerating his current testosterone replacement therapy without adverse effects. He has noticed no symptoms of daytime somnolence consistent with sleep apnea. He has had no urinary symptoms. No mood disturbance. Overall satisfied with his current treatment        Review of Systems   Constitutional: Negative for chills and fever.   HENT: Negative for congestion.    Respiratory: Negative for cough, chest tightness and shortness of breath.  Cardiovascular: Negative for chest pain, palpitations and leg swelling.   Gastrointestinal: Negative for abdominal pain, anal bleeding, constipation, diarrhea and nausea.     Review of Systems  Objective:   /72   Pulse (!) 105   Temp 98.3 °F (36.8 °C)   Wt 103.4 kg (228 lb)   SpO2 96%   BMI 34.67 kg/m²   BP Readings from Last 3 Encounters:   01/30/24 134/72   01/09/24 118/80   07/28/23 134/82    Wt Readings from Last 3 Encounters:

## 2024-01-30 NOTE — PATIENT INSTRUCTIONS
Examine your lifestyle and the barriers to bad and good habits and how you can design your life to make better choices      Make it EASY to do the RIGHT THINGS.    1)  You can choose to Get 150 min/week of moderate exercise (can talk but can't sing) or 75 min/week of vigorous exercise (can't talk)   Examples: Walking, treadmill, bicycling, attending a gym.    2)  You can choose to Get 7-9 hours of sleep per night    Allow enough time each night to get adequate sleep.  Keep regular evening hours, same to bed and getting up every day.    3)  You can choose to Strength Train 2 x a week on non-consecutive days   Bodyweight exercises such push ups, sit ups, pilates or yoga   Purchase resistance bands to perform exercises   Utilize phone apps such as: [employer] or [employer]   Contact your local gym for a     4)  You can choose good nutrition.  Only eat your goal weight (in lbs) x 10 calories/day and get 5 servings of Vegetables/day   Choose to eat a healthy diet such as the Mediterranean diet.    Plant based diets reduce risk of heart attack/stroke and will help you feel full on less food.    Avoid highly processed foods and processed carbohydrates.   Utilize apps to track your food:  Examples: Loseit, Myfitnesspal, or CalorieCounter by [employer]   Choose to follow a program.   Examples: Weight Watchers, Satinder Andersen or Nutrisystem   Utilize apps identify and improve eating habits:   Examples: Eat Right Now, Noom    5)  You can choose to drink less alcohol: Drink less than 1-2 drinks/day no more than 7 drinks per week.    Alcohol will disrupt your sleep and add calories to your day    6)  You can choose to Practice Mindfulness and work on stress reduction.    Utilize apps such as Calm, Headspace, Abide  Contact local behavioral health specialists      Small changes every day will add up

## 2024-02-02 ENCOUNTER — TELEPHONE (OUTPATIENT)
Dept: NEUROLOGY | Age: 54
End: 2024-02-02

## 2024-02-02 NOTE — TELEPHONE ENCOUNTER
Received a referral for this patient. Called and left patient a VM to call our office back to were we can get patient scheduled an appointment with Dr. Ge.

## 2024-06-07 SDOH — ECONOMIC STABILITY: FOOD INSECURITY: WITHIN THE PAST 12 MONTHS, THE FOOD YOU BOUGHT JUST DIDN'T LAST AND YOU DIDN'T HAVE MONEY TO GET MORE.: NEVER TRUE

## 2024-06-07 SDOH — ECONOMIC STABILITY: TRANSPORTATION INSECURITY
IN THE PAST 12 MONTHS, HAS LACK OF TRANSPORTATION KEPT YOU FROM MEETINGS, WORK, OR FROM GETTING THINGS NEEDED FOR DAILY LIVING?: NO

## 2024-06-07 SDOH — ECONOMIC STABILITY: FOOD INSECURITY: WITHIN THE PAST 12 MONTHS, YOU WORRIED THAT YOUR FOOD WOULD RUN OUT BEFORE YOU GOT MONEY TO BUY MORE.: NEVER TRUE

## 2024-06-07 SDOH — ECONOMIC STABILITY: INCOME INSECURITY: HOW HARD IS IT FOR YOU TO PAY FOR THE VERY BASICS LIKE FOOD, HOUSING, MEDICAL CARE, AND HEATING?: NOT HARD AT ALL

## 2024-06-10 ENCOUNTER — OFFICE VISIT (OUTPATIENT)
Dept: PRIMARY CARE CLINIC | Age: 54
End: 2024-06-10
Payer: COMMERCIAL

## 2024-06-10 VITALS
OXYGEN SATURATION: 96 % | TEMPERATURE: 97.3 F | SYSTOLIC BLOOD PRESSURE: 136 MMHG | WEIGHT: 230 LBS | BODY MASS INDEX: 34.97 KG/M2 | DIASTOLIC BLOOD PRESSURE: 82 MMHG | HEART RATE: 87 BPM

## 2024-06-10 DIAGNOSIS — R06.00 DYSPNEA, UNSPECIFIED TYPE: ICD-10-CM

## 2024-06-10 DIAGNOSIS — E29.1 HYPOGONADISM IN MALE: ICD-10-CM

## 2024-06-10 DIAGNOSIS — R53.83 OTHER FATIGUE: ICD-10-CM

## 2024-06-10 DIAGNOSIS — G47.33 OSA ON CPAP: ICD-10-CM

## 2024-06-10 DIAGNOSIS — Z12.5 ENCOUNTER FOR PROSTATE CANCER SCREENING: ICD-10-CM

## 2024-06-10 DIAGNOSIS — F41.8 DEPRESSION WITH ANXIETY: ICD-10-CM

## 2024-06-10 DIAGNOSIS — E78.00 HYPERCHOLESTEROLEMIA: ICD-10-CM

## 2024-06-10 DIAGNOSIS — K21.9 GASTROESOPHAGEAL REFLUX DISEASE WITHOUT ESOPHAGITIS: ICD-10-CM

## 2024-06-10 DIAGNOSIS — I10 ESSENTIAL (PRIMARY) HYPERTENSION: ICD-10-CM

## 2024-06-10 DIAGNOSIS — E29.1 HYPOGONADISM IN MALE: Primary | ICD-10-CM

## 2024-06-10 LAB
ALBUMIN SERPL-MCNC: 4.3 G/DL (ref 3.5–5.2)
ALP SERPL-CCNC: 67 U/L (ref 40–130)
ALT SERPL-CCNC: 21 U/L (ref 5–41)
ANION GAP SERPL CALCULATED.3IONS-SCNC: 10 MMOL/L (ref 7–19)
AST SERPL-CCNC: 20 U/L (ref 5–40)
BACTERIA URNS QL MICRO: NEGATIVE /HPF
BASOPHILS # BLD: 0 K/UL (ref 0–0.2)
BASOPHILS NFR BLD: 0.4 % (ref 0–1)
BILIRUB SERPL-MCNC: 0.7 MG/DL (ref 0.2–1.2)
BILIRUB UR QL STRIP: NEGATIVE
BNP BLD-MCNC: <36 PG/ML (ref 0–124)
BUN SERPL-MCNC: 13 MG/DL (ref 6–20)
CALCIUM SERPL-MCNC: 9 MG/DL (ref 8.6–10)
CHLORIDE SERPL-SCNC: 101 MMOL/L (ref 98–111)
CLARITY UR: CLEAR
CO2 SERPL-SCNC: 29 MMOL/L (ref 22–29)
COLOR UR: YELLOW
CREAT SERPL-MCNC: 1.1 MG/DL (ref 0.5–1.2)
CRYSTALS URNS MICRO: NORMAL /HPF
EOSINOPHIL # BLD: 0.1 K/UL (ref 0–0.6)
EOSINOPHIL NFR BLD: 2.3 % (ref 0–5)
EPI CELLS #/AREA URNS AUTO: 0 /HPF (ref 0–5)
ERYTHROCYTE [DISTWIDTH] IN BLOOD BY AUTOMATED COUNT: 12.8 % (ref 11.5–14.5)
GLUCOSE SERPL-MCNC: 96 MG/DL (ref 74–109)
GLUCOSE UR STRIP.AUTO-MCNC: NEGATIVE MG/DL
HCT VFR BLD AUTO: 44 % (ref 42–52)
HGB BLD-MCNC: 14.7 G/DL (ref 14–18)
HGB UR STRIP.AUTO-MCNC: NEGATIVE MG/L
HYALINE CASTS #/AREA URNS AUTO: 0 /HPF (ref 0–8)
IMM GRANULOCYTES # BLD: 0 K/UL
KETONES UR STRIP.AUTO-MCNC: NEGATIVE MG/DL
LEUKOCYTE ESTERASE UR QL STRIP.AUTO: NEGATIVE
LYMPHOCYTES # BLD: 1.6 K/UL (ref 1.1–4.5)
LYMPHOCYTES NFR BLD: 29.8 % (ref 20–40)
MAGNESIUM SERPL-MCNC: 1.9 MG/DL (ref 1.6–2.6)
MCH RBC QN AUTO: 31 PG (ref 27–31)
MCHC RBC AUTO-ENTMCNC: 33.4 G/DL (ref 33–37)
MCV RBC AUTO: 92.8 FL (ref 80–94)
MONOCYTES # BLD: 0.5 K/UL (ref 0–0.9)
MONOCYTES NFR BLD: 8.9 % (ref 0–10)
NEUTROPHILS # BLD: 3.1 K/UL (ref 1.5–7.5)
NEUTS SEG NFR BLD: 58.4 % (ref 50–65)
NITRITE UR QL STRIP.AUTO: NEGATIVE
PH UR STRIP.AUTO: 7 [PH] (ref 5–8)
PLATELET # BLD AUTO: 205 K/UL (ref 130–400)
PMV BLD AUTO: 9.5 FL (ref 9.4–12.4)
POTASSIUM SERPL-SCNC: 3.8 MMOL/L (ref 3.5–5)
PROT SERPL-MCNC: 6.5 G/DL (ref 6.6–8.7)
PROT UR STRIP.AUTO-MCNC: NEGATIVE MG/DL
RBC # BLD AUTO: 4.74 M/UL (ref 4.7–6.1)
RBC #/AREA URNS AUTO: 0 /HPF (ref 0–4)
SODIUM SERPL-SCNC: 140 MMOL/L (ref 136–145)
SP GR UR STRIP.AUTO: 1.02 (ref 1–1.03)
TESTOST SERPL-MCNC: 665.2 NG/DL (ref 193–740)
UROBILINOGEN UR STRIP.AUTO-MCNC: 1 E.U./DL
WBC # BLD AUTO: 5.3 K/UL (ref 4.8–10.8)
WBC #/AREA URNS AUTO: 1 /HPF (ref 0–5)

## 2024-06-10 PROCEDURE — 99214 OFFICE O/P EST MOD 30 MIN: CPT | Performed by: FAMILY MEDICINE

## 2024-06-10 PROCEDURE — 3075F SYST BP GE 130 - 139MM HG: CPT | Performed by: FAMILY MEDICINE

## 2024-06-10 PROCEDURE — 3079F DIAST BP 80-89 MM HG: CPT | Performed by: FAMILY MEDICINE

## 2024-06-10 RX ORDER — TRIAMTERENE AND HYDROCHLOROTHIAZIDE 37.5; 25 MG/1; MG/1
1 TABLET ORAL DAILY
Qty: 90 TABLET | Refills: 1 | Status: SHIPPED | OUTPATIENT
Start: 2024-06-10

## 2024-06-10 NOTE — PATIENT INSTRUCTIONS
Examine your lifestyle and the barriers to bad and good habits and how you can design your life to make better choices      Make it EASY to do the RIGHT THINGS.    1)  You can choose to Get 150 min/week of moderate exercise (can talk but can't sing) or 75 min/week of vigorous exercise (can't talk)   Examples: Walking, treadmill, bicycling, attending a gym.    2)  You can choose to Get 7-9 hours of sleep per night    Allow enough time each night to get adequate sleep.  Keep regular evening hours, same to bed and getting up every day.    3)  You can choose to Strength Train 2 x a week on non-consecutive days   Bodyweight exercises such push ups, sit ups, pilates or yoga   Purchase resistance bands to perform exercises   Utilize phone apps such as: [employer] or [employer]   Contact your local gym for a     4)  You can choose good nutrition.  Only eat your goal weight (in lbs) x 10 calories/day and get 5 servings of Vegetables/day   Choose to eat a healthy diet such as the Mediterranean diet.    Plant based diets reduce risk of heart attack/stroke and will help you feel full on less food.    Avoid highly processed foods and processed carbohydrates.   Utilize apps to track your food:  Examples: Loseit, Myfitnesspal, or CalorieCounter by [employer]   Choose to follow a program.   Examples: Weight Watchers, Satin[employer] Andersen or Nutrisystem   Utilize apps identify and improve eating habits:   Examples: Eat Right Now, Noom    5)  You can choose to drink less alcohol: Drink less than 1-2 drinks/day no more than 7 drinks per week.    Alcohol will disrupt your sleep and add calories to your day    6)  You can choose to Practice Mindfulness and work on stress reduction.    Utilize apps such as Calm, Headspace, Abide  Contact local behavioral health specialists      Small changes every day will add up

## 2024-06-10 NOTE — PROGRESS NOTES
[facility]  [address]  [address]  [place] [address]  Dept: [phone]  Dept Fax: [phone]  Loc: [phone]    Pino Crooks is a 54 y.o. male who presents today for his medical conditions/complaints as noted below.  Pino Crooks is here for 3 Month Follow-Up and Foot Swelling (Foot and ankle swelling x 2 months)        Subjective:   CC:  Here today to discuss the following:    Status increase bilateral lower extremity edema over the past 2 weeks.  Also with occasional shortness of breath.  He attributes it to working outside on his feet all day.  He was previously on hydrochlorothiazide but is discontinued in the summer due to fear of dehydration.  He is maintain adequate blood pressure control and his current medication.  -Denies any orthopnea.  He does have some occasional shortness of breath.    Hypogonadism  Tolerating his current testosterone replacement therapy without adverse effects. He has noticed no symptoms of daytime somnolence consistent with sleep apnea. He has had no urinary symptoms. No mood disturbance. Overall satisfied with his current treatment  DENZEL on CPAP  Compliant with CPAP.  No daytime somnolence.   Feels rested for the most part when wearing CPAP.  Hypertension  Compliant with medications.  No adverse effects from medication.  No lightheadedness, palpitations, or chest pain.      Depression with Anxiety  Compliant with medications.  No adverse effects from medication.  Depression and anxiety symptoms are stable today. No suicidal or homicidal ideation. Excessive worry, insomnia, and anxiousness are stable. Energy, concentration, and apathy are stable.  Gastroesophageal Reflux Disease  Symptoms currently under control.   Medication adequately controls symptoms.  No hematochezia or melena.     Hyperlipidemia  Tolerating current cholesterol medication without side effects. . Attempting to reduce processed sugar and cholesterol

## 2024-06-14 DIAGNOSIS — F41.8 DEPRESSION WITH ANXIETY: ICD-10-CM

## 2024-06-14 RX ORDER — BUPROPION HYDROCHLORIDE 150 MG/1
150 TABLET ORAL EVERY MORNING
Qty: 90 TABLET | Refills: 1 | Status: SHIPPED | OUTPATIENT
Start: 2024-06-14

## 2024-07-23 ENCOUNTER — OFFICE VISIT (OUTPATIENT)
Dept: NEUROLOGY | Age: 54
End: 2024-07-23
Payer: COMMERCIAL

## 2024-07-23 VITALS
SYSTOLIC BLOOD PRESSURE: 133 MMHG | DIASTOLIC BLOOD PRESSURE: 86 MMHG | BODY MASS INDEX: 32.13 KG/M2 | WEIGHT: 212 LBS | HEIGHT: 68 IN | HEART RATE: 76 BPM

## 2024-07-23 DIAGNOSIS — G47.33 SLEEP APNEA, OBSTRUCTIVE: Primary | ICD-10-CM

## 2024-07-23 PROCEDURE — 3075F SYST BP GE 130 - 139MM HG: CPT | Performed by: PSYCHIATRY & NEUROLOGY

## 2024-07-23 PROCEDURE — 99203 OFFICE O/P NEW LOW 30 MIN: CPT | Performed by: PSYCHIATRY & NEUROLOGY

## 2024-07-23 PROCEDURE — 3079F DIAST BP 80-89 MM HG: CPT | Performed by: PSYCHIATRY & NEUROLOGY

## 2024-07-23 NOTE — PROGRESS NOTES
mouth daily 90 tablet 1    testosterone cypionate (DEPOTESTOTERONE CYPIONATE) 200 MG/ML injection INJECT 1.5 ML INTO THE MUSCLE EVERY 14 DAYS 10 mL 5    SYRINGE-NEEDLE, DISP, 3 ML (SAFETY SYRINGE/NEEDLE) 22G X 1-1/2\" 3 ML MISC Use one needle to administer testosterone every 2 weeks 6 each 3    SYRINGE-NEEDLE, DISP, 3 ML 18G X 1-1/2\" 3 ML MISC Use one syringe to draw testosterone every 2 weeks 6 each 3    atorvastatin (LIPITOR) 40 MG tablet TAKE 1 TABLET BY MOUTH EVERY DAY 90 tablet 2    ramipril (ALTACE) 10 MG capsule TAKE 2 CAPSULES BY MOUTH EVERY  capsule 2    amLODIPine (NORVASC) 5 MG tablet TAKE 1 TABLET BY MOUTH EVERY DAY 90 tablet 3    tamsulosin (FLOMAX) 0.4 MG capsule Take 1 capsule by mouth nightly 30 capsule 11    lamoTRIgine (LAMICTAL) 100 MG tablet TAKE 1 TABLET BY MOUTH EVERY DAY 90 tablet 3    DUEXIS 800-26.6 MG TABS Take 1 tablet by mouth 3 times daily as needed      Testosterone Cypionate 200 MG/ML SOLN Inject as directed every 14 days      cetirizine (ZYRTEC) 10 MG tablet Take 1 tablet by mouth daily      CPAP Machine MISC CPAP and supplies 1 each 0    omeprazole (PRILOSEC) 20 MG delayed release capsule Take 1 capsule by mouth Daily 30 capsule 5     No current facility-administered medications for this visit.       Allergies as of 07/23/2024 - Fully Reviewed 07/23/2024   Allergen Reaction Noted    Influenza virus vaccine Other (See Comments) 08/24/2017       FAMILY HISTORY:   Family History   Problem Relation Age of Onset    Diabetes Mother     High Blood Pressure Mother     Diabetes Father     Heart Disease Father     High Cholesterol Father        SOCIAL HISTORY:   Pino Crooks is , lives in [place], [place], and is a .      REVIEW OF SYSTEMS:  Review of Systems   Constitutional:  Negative for chills and fever.   HENT:  Negative for hearing loss and tinnitus.    Eyes:  Negative for photophobia and visual disturbance.   Respiratory:  Negative for cough and

## 2024-09-10 DIAGNOSIS — E78.00 HYPERCHOLESTEROLEMIA: ICD-10-CM

## 2024-09-10 DIAGNOSIS — I10 ESSENTIAL (PRIMARY) HYPERTENSION: ICD-10-CM

## 2024-09-10 RX ORDER — ATORVASTATIN CALCIUM 40 MG/1
40 TABLET, FILM COATED ORAL DAILY
Qty: 90 TABLET | Refills: 2 | Status: SHIPPED | OUTPATIENT
Start: 2024-09-10

## 2024-09-10 RX ORDER — LAMOTRIGINE 100 MG/1
TABLET ORAL
Qty: 90 TABLET | Refills: 3 | Status: SHIPPED | OUTPATIENT
Start: 2024-09-10

## 2024-09-10 RX ORDER — RAMIPRIL 10 MG/1
20 CAPSULE ORAL DAILY
Qty: 180 CAPSULE | Refills: 2 | Status: SHIPPED | OUTPATIENT
Start: 2024-09-10

## 2024-09-13 DIAGNOSIS — F41.8 DEPRESSION WITH ANXIETY: ICD-10-CM

## 2024-09-13 RX ORDER — TRIAMTERENE AND HYDROCHLOROTHIAZIDE 37.5; 25 MG/1; MG/1
1 TABLET ORAL DAILY
Qty: 90 TABLET | Refills: 1 | OUTPATIENT
Start: 2024-09-13

## 2024-09-13 RX ORDER — BUPROPION HYDROCHLORIDE 150 MG/1
150 TABLET ORAL EVERY MORNING
Qty: 90 TABLET | Refills: 1 | OUTPATIENT
Start: 2024-09-13

## 2024-09-15 DIAGNOSIS — I10 ESSENTIAL (PRIMARY) HYPERTENSION: ICD-10-CM

## 2024-09-16 RX ORDER — AMLODIPINE BESYLATE 5 MG/1
5 TABLET ORAL DAILY
Qty: 90 TABLET | Refills: 1 | Status: SHIPPED | OUTPATIENT
Start: 2024-09-16

## 2024-12-03 DIAGNOSIS — E29.1 HYPOGONADISM IN MALE: ICD-10-CM

## 2024-12-03 RX ORDER — TESTOSTERONE CYPIONATE 200 MG/ML
INJECTION, SOLUTION INTRAMUSCULAR
Qty: 10 ML | Refills: 5 | Status: SHIPPED | OUTPATIENT
Start: 2024-12-03 | End: 2025-05-28

## 2024-12-03 NOTE — TELEPHONE ENCOUNTER
Pino Crooks called to request a refill on his medication.      Last office visit : 6/10/2024   Next office visit : 12/10/2024     Last UDS: No results found for: \"LABAMPH\", \"LABBENZ\", \"BUPRENUR\", \"COCAIMETSCRU\", \"GABAPENTIN\", \"MDMA\", \"OXTCOSU\", \"PROPOXYPHENE\", \"THCSCREENUR\", \"TRICYUR\"    Last Saud: today  Medication Contract: next ov   Last Fill: 01/30/24    Requested Prescriptions     Pending Prescriptions Disp Refills    testosterone cypionate (DEPOTESTOTERONE CYPIONATE) 200 MG/ML injection 10 mL 5     Sig: INJECT 1.5 ML INTO THE MUSCLE EVERY 14 DAYS         Please approve or refuse this medication.   Sharri Peguero LPN

## 2024-12-27 DIAGNOSIS — Z12.5 ENCOUNTER FOR PROSTATE CANCER SCREENING: ICD-10-CM

## 2024-12-27 DIAGNOSIS — R53.83 OTHER FATIGUE: ICD-10-CM

## 2024-12-27 DIAGNOSIS — E78.00 HYPERCHOLESTEROLEMIA: ICD-10-CM

## 2024-12-27 LAB
ALBUMIN SERPL-MCNC: 4.7 G/DL (ref 3.5–5.2)
ALP SERPL-CCNC: 83 U/L (ref 40–129)
ALT SERPL-CCNC: 27 U/L (ref 5–41)
ANION GAP SERPL CALCULATED.3IONS-SCNC: 11 MMOL/L (ref 7–19)
AST SERPL-CCNC: 20 U/L (ref 5–40)
BASOPHILS # BLD: 0 K/UL (ref 0–0.2)
BASOPHILS NFR BLD: 0.5 % (ref 0–1)
BILIRUB SERPL-MCNC: 0.7 MG/DL (ref 0.2–1.2)
BUN SERPL-MCNC: 13 MG/DL (ref 6–20)
CALCIUM SERPL-MCNC: 9.4 MG/DL (ref 8.6–10)
CHLORIDE SERPL-SCNC: 98 MMOL/L (ref 98–111)
CHOLEST SERPL-MCNC: 171 MG/DL (ref 0–199)
CO2 SERPL-SCNC: 29 MMOL/L (ref 22–29)
CREAT SERPL-MCNC: 1.2 MG/DL (ref 0.7–1.2)
EOSINOPHIL # BLD: 0.2 K/UL (ref 0–0.6)
EOSINOPHIL NFR BLD: 3.3 % (ref 0–5)
ERYTHROCYTE [DISTWIDTH] IN BLOOD BY AUTOMATED COUNT: 13 % (ref 11.5–14.5)
GLUCOSE SERPL-MCNC: 102 MG/DL (ref 70–99)
HCT VFR BLD AUTO: 45.8 % (ref 42–52)
HDLC SERPL-MCNC: 41 MG/DL (ref 40–60)
HGB BLD-MCNC: 15.3 G/DL (ref 14–18)
IMM GRANULOCYTES # BLD: 0 K/UL
LDLC SERPL CALC-MCNC: 85 MG/DL
LYMPHOCYTES # BLD: 1.8 K/UL (ref 1.1–4.5)
LYMPHOCYTES NFR BLD: 27.3 % (ref 20–40)
MCH RBC QN AUTO: 31 PG (ref 27–31)
MCHC RBC AUTO-ENTMCNC: 33.4 G/DL (ref 33–37)
MCV RBC AUTO: 92.9 FL (ref 80–94)
MONOCYTES # BLD: 0.7 K/UL (ref 0–0.9)
MONOCYTES NFR BLD: 10.7 % (ref 0–10)
NEUTROPHILS # BLD: 3.7 K/UL (ref 1.5–7.5)
NEUTS SEG NFR BLD: 58 % (ref 50–65)
PLATELET # BLD AUTO: 245 K/UL (ref 130–400)
PMV BLD AUTO: 9.9 FL (ref 9.4–12.4)
POTASSIUM SERPL-SCNC: 4.3 MMOL/L (ref 3.5–5)
PROT SERPL-MCNC: 7.1 G/DL (ref 6.4–8.3)
PSA SERPL-MCNC: 0.68 NG/ML (ref 0–4)
RBC # BLD AUTO: 4.93 M/UL (ref 4.7–6.1)
SODIUM SERPL-SCNC: 138 MMOL/L (ref 136–145)
T4 FREE SERPL-MCNC: 1.07 NG/DL (ref 0.93–1.7)
TRIGL SERPL-MCNC: 223 MG/DL (ref 0–149)
TSH SERPL DL<=0.005 MIU/L-ACNC: 1.57 UIU/ML (ref 0.27–4.2)
WBC # BLD AUTO: 6.4 K/UL (ref 4.8–10.8)

## 2025-01-19 ASSESSMENT — PATIENT HEALTH QUESTIONNAIRE - PHQ9
SUM OF ALL RESPONSES TO PHQ QUESTIONS 1-9: 3
7. TROUBLE CONCENTRATING ON THINGS, SUCH AS READING THE NEWSPAPER OR WATCHING TELEVISION: NOT AT ALL
2. FEELING DOWN, DEPRESSED OR HOPELESS: NOT AT ALL
1. LITTLE INTEREST OR PLEASURE IN DOING THINGS: NOT AT ALL
9. THOUGHTS THAT YOU WOULD BE BETTER OFF DEAD, OR OF HURTING YOURSELF: NOT AT ALL
8. MOVING OR SPEAKING SO SLOWLY THAT OTHER PEOPLE COULD HAVE NOTICED. OR THE OPPOSITE - BEING SO FIDGETY OR RESTLESS THAT YOU HAVE BEEN MOVING AROUND A LOT MORE THAN USUAL: NOT AT ALL
2. FEELING DOWN, DEPRESSED OR HOPELESS: NOT AT ALL
10. IF YOU CHECKED OFF ANY PROBLEMS, HOW DIFFICULT HAVE THESE PROBLEMS MADE IT FOR YOU TO DO YOUR WORK, TAKE CARE OF THINGS AT HOME, OR GET ALONG WITH OTHER PEOPLE: NOT DIFFICULT AT ALL
4. FEELING TIRED OR HAVING LITTLE ENERGY: SEVERAL DAYS
3. TROUBLE FALLING OR STAYING ASLEEP: SEVERAL DAYS
SUM OF ALL RESPONSES TO PHQ9 QUESTIONS 1 & 2: 0
7. TROUBLE CONCENTRATING ON THINGS, SUCH AS READING THE NEWSPAPER OR WATCHING TELEVISION: NOT AT ALL
8. MOVING OR SPEAKING SO SLOWLY THAT OTHER PEOPLE COULD HAVE NOTICED. OR THE OPPOSITE, BEING SO FIGETY OR RESTLESS THAT YOU HAVE BEEN MOVING AROUND A LOT MORE THAN USUAL: NOT AT ALL
10. IF YOU CHECKED OFF ANY PROBLEMS, HOW DIFFICULT HAVE THESE PROBLEMS MADE IT FOR YOU TO DO YOUR WORK, TAKE CARE OF THINGS AT HOME, OR GET ALONG WITH OTHER PEOPLE: NOT DIFFICULT AT ALL
1. LITTLE INTEREST OR PLEASURE IN DOING THINGS: NOT AT ALL
SUM OF ALL RESPONSES TO PHQ QUESTIONS 1-9: 3
6. FEELING BAD ABOUT YOURSELF - OR THAT YOU ARE A FAILURE OR HAVE LET YOURSELF OR YOUR FAMILY DOWN: NOT AT ALL
SUM OF ALL RESPONSES TO PHQ QUESTIONS 1-9: 3
SUM OF ALL RESPONSES TO PHQ QUESTIONS 1-9: 3
3. TROUBLE FALLING OR STAYING ASLEEP: SEVERAL DAYS
4. FEELING TIRED OR HAVING LITTLE ENERGY: SEVERAL DAYS
5. POOR APPETITE OR OVEREATING: SEVERAL DAYS
9. THOUGHTS THAT YOU WOULD BE BETTER OFF DEAD, OR OF HURTING YOURSELF: NOT AT ALL
5. POOR APPETITE OR OVEREATING: SEVERAL DAYS
SUM OF ALL RESPONSES TO PHQ QUESTIONS 1-9: 3
6. FEELING BAD ABOUT YOURSELF - OR THAT YOU ARE A FAILURE OR HAVE LET YOURSELF OR YOUR FAMILY DOWN: NOT AT ALL

## 2025-01-20 ENCOUNTER — OFFICE VISIT (OUTPATIENT)
Dept: PRIMARY CARE CLINIC | Age: 55
End: 2025-01-20
Payer: COMMERCIAL

## 2025-01-20 VITALS
HEART RATE: 95 BPM | SYSTOLIC BLOOD PRESSURE: 124 MMHG | OXYGEN SATURATION: 97 % | WEIGHT: 221 LBS | DIASTOLIC BLOOD PRESSURE: 74 MMHG | HEIGHT: 68 IN | BODY MASS INDEX: 33.49 KG/M2 | TEMPERATURE: 97.6 F

## 2025-01-20 DIAGNOSIS — K21.9 GASTROESOPHAGEAL REFLUX DISEASE WITHOUT ESOPHAGITIS: ICD-10-CM

## 2025-01-20 DIAGNOSIS — I10 ESSENTIAL (PRIMARY) HYPERTENSION: Primary | ICD-10-CM

## 2025-01-20 DIAGNOSIS — G47.33 OSA ON CPAP: ICD-10-CM

## 2025-01-20 DIAGNOSIS — E29.1 HYPOGONADISM IN MALE: ICD-10-CM

## 2025-01-20 DIAGNOSIS — F41.8 DEPRESSION WITH ANXIETY: ICD-10-CM

## 2025-01-20 DIAGNOSIS — E78.00 HYPERCHOLESTEROLEMIA: ICD-10-CM

## 2025-01-20 PROCEDURE — 3078F DIAST BP <80 MM HG: CPT | Performed by: FAMILY MEDICINE

## 2025-01-20 PROCEDURE — 3074F SYST BP LT 130 MM HG: CPT | Performed by: FAMILY MEDICINE

## 2025-01-20 PROCEDURE — 99214 OFFICE O/P EST MOD 30 MIN: CPT | Performed by: FAMILY MEDICINE

## 2025-01-20 SDOH — ECONOMIC STABILITY: FOOD INSECURITY: WITHIN THE PAST 12 MONTHS, THE FOOD YOU BOUGHT JUST DIDN'T LAST AND YOU DIDN'T HAVE MONEY TO GET MORE.: NEVER TRUE

## 2025-01-20 SDOH — ECONOMIC STABILITY: FOOD INSECURITY: WITHIN THE PAST 12 MONTHS, YOU WORRIED THAT YOUR FOOD WOULD RUN OUT BEFORE YOU GOT MONEY TO BUY MORE.: NEVER TRUE

## 2025-01-20 NOTE — PATIENT INSTRUCTIONS
Seasonal Affective Disorder:   Light Therapy      Light therapy involves exposure to visible light that produces a minimum of 2500 lux at eye level. Early studies used a two-hour session of 2500-lux exposure, but subsequent studies have found that 10,000-lux for 30 minutes per day is equally effective.    Light therapy is initiated with a 10,000-lux box. The patient's eyes should remain open throughout the treatment session. Staring directly into the light source, however, is not advised. Treatment is started with one, 10 to 15 minute session per day, gradually increasing the session's duration to 30 to 45 minutes, depending upon response. Sessions should be increased to twice a day, if symptoms remain unresolved or worsen. Although there is no apparent reason for limiting the duration of sessions if the side effects are not severe, ninety minutes a day remains the conventional daily maximum duration of therapy.    To reduce risks associated with poor quality construction, commercially available fixtures are recommended over homemade devices. Fluorescent light is preferred over incandescent, primarily because the small point source of the latter is more conducive to retinal damage. In addition, patients are advised to seek fixtures with features designed to protect the eyes such as light dispersion and screens that eliminate UV rays. Contrary to initial belief, \"full-spectrum\" light is unnecessary. Intensity, not spectrum, appears to be the critical factor in attaining an antidepressant effect     The optimal time of day for light therapy has not been established. Some studies have reported that morning sessions produced better outcomes.  Convenience should dictate the decision since there are no clear indications supported by controlled data.    Some patients have an immediate response to light therapy, though typically it takes two to four days before a sustained antidepressant response is evident. Some studies

## 2025-01-20 NOTE — PROGRESS NOTES
[facility]  [address]  [address]  [place] [address]  Dept: [phone]  Dept Fax: [phone]  Loc: [phone]    Pino Crooks is a 54 y.o. male who presents today for his medical conditions/complaints as noted below.  Pino Crooks is here for Annual Exam        Subjective:   CC:  Here today to discuss the following:    Hypertension  Compliant with medications.  No adverse effects from medication.  No lightheadedness, palpitations, or chest pain.      Hyperlipidemia  Tolerating current cholesterol medication without side effects. . Attempting to reduce processed sugar and cholesterol from diet.    DENZEL on CPAP  Compliant with CPAP.  No daytime somnolence.   Feels rested for the most part when wearing CPAP.  Gastroesophageal Reflux Disease  Symptoms currently under control.   Medication adequately controls symptoms.  No hematochezia or melena.     Depression with Anxiety  Compliant with medications.  No adverse effects from medication.  Depression and anxiety symptoms are stable today. No suicidal or homicidal ideation. Excessive worry, insomnia, and anxiousness are stable. Energy, concentration, and apathy are stable.  Hypogonadism  Tolerating his current testosterone replacement therapy without adverse effects. He has noticed no symptoms of daytime somnolence consistent with sleep apnea. He has had no urinary symptoms. No mood disturbance. Overall satisfied with his current treatment        Review of Systems   Constitutional: Negative for chills and fever.   HENT: Negative for congestion.    Respiratory: Negative for cough, chest tightness and shortness of breath.  Cardiovascular: Negative for chest pain, palpitations and leg swelling.   Gastrointestinal: Negative for abdominal pain, anal bleeding, constipation, diarrhea and nausea.     Review of Systems  Objective:   /74   Pulse 95   Temp 97.6 °F (36.4 °C)   Ht 1.727 m (5' 8\")   Wt 100.2 kg

## 2025-03-08 DIAGNOSIS — I10 ESSENTIAL (PRIMARY) HYPERTENSION: ICD-10-CM

## 2025-03-10 RX ORDER — AMLODIPINE BESYLATE 5 MG/1
5 TABLET ORAL DAILY
Qty: 90 TABLET | Refills: 1 | Status: SHIPPED | OUTPATIENT
Start: 2025-03-10

## 2025-03-10 NOTE — TELEPHONE ENCOUNTER
Pino Crooks called to request a refill on his medication.      Last office visit : 1/20/2025   Next office visit : 7/21/2025     Requested Prescriptions     Pending Prescriptions Disp Refills    amLODIPine (NORVASC) 5 MG tablet [Pharmacy Med Name: AMLODIPINE BESYLATE 5 MG TAB] 90 tablet 1     Sig: TAKE 1 TABLET BY MOUTH EVERY DAY            Sheryl Harper MA

## 2025-05-25 DIAGNOSIS — I10 ESSENTIAL (PRIMARY) HYPERTENSION: ICD-10-CM

## 2025-05-25 DIAGNOSIS — F41.8 DEPRESSION WITH ANXIETY: ICD-10-CM

## 2025-05-25 DIAGNOSIS — E78.00 HYPERCHOLESTEROLEMIA: ICD-10-CM

## 2025-05-26 RX ORDER — RAMIPRIL 10 MG/1
20 CAPSULE ORAL DAILY
Qty: 180 CAPSULE | Refills: 1 | Status: SHIPPED | OUTPATIENT
Start: 2025-05-26

## 2025-05-26 RX ORDER — LAMOTRIGINE 100 MG/1
100 TABLET ORAL DAILY
Qty: 90 TABLET | Refills: 1 | Status: SHIPPED | OUTPATIENT
Start: 2025-05-26

## 2025-05-26 RX ORDER — BUPROPION HYDROCHLORIDE 150 MG/1
150 TABLET ORAL EVERY MORNING
Qty: 90 TABLET | Refills: 1 | Status: SHIPPED | OUTPATIENT
Start: 2025-05-26

## 2025-05-26 RX ORDER — AMLODIPINE BESYLATE 5 MG/1
5 TABLET ORAL DAILY
Qty: 90 TABLET | Refills: 1 | OUTPATIENT
Start: 2025-05-26

## 2025-05-26 RX ORDER — ATORVASTATIN CALCIUM 40 MG/1
40 TABLET, FILM COATED ORAL DAILY
Qty: 90 TABLET | Refills: 1 | Status: SHIPPED | OUTPATIENT
Start: 2025-05-26

## 2025-07-21 ENCOUNTER — OFFICE VISIT (OUTPATIENT)
Dept: PRIMARY CARE CLINIC | Age: 55
End: 2025-07-21
Payer: COMMERCIAL

## 2025-07-21 VITALS
SYSTOLIC BLOOD PRESSURE: 114 MMHG | WEIGHT: 227 LBS | HEART RATE: 97 BPM | TEMPERATURE: 98.8 F | OXYGEN SATURATION: 96 % | BODY MASS INDEX: 34.52 KG/M2 | DIASTOLIC BLOOD PRESSURE: 68 MMHG

## 2025-07-21 DIAGNOSIS — Z12.5 ENCOUNTER FOR PROSTATE CANCER SCREENING: ICD-10-CM

## 2025-07-21 DIAGNOSIS — G47.33 OSA ON CPAP: ICD-10-CM

## 2025-07-21 DIAGNOSIS — E29.1 HYPOGONADISM IN MALE: ICD-10-CM

## 2025-07-21 DIAGNOSIS — F41.8 DEPRESSION WITH ANXIETY: ICD-10-CM

## 2025-07-21 DIAGNOSIS — E78.00 HYPERCHOLESTEROLEMIA: ICD-10-CM

## 2025-07-21 DIAGNOSIS — I10 ESSENTIAL (PRIMARY) HYPERTENSION: Primary | ICD-10-CM

## 2025-07-21 DIAGNOSIS — I10 ESSENTIAL (PRIMARY) HYPERTENSION: ICD-10-CM

## 2025-07-21 DIAGNOSIS — Z51.81 MEDICATION MONITORING ENCOUNTER: ICD-10-CM

## 2025-07-21 DIAGNOSIS — R53.83 OTHER FATIGUE: ICD-10-CM

## 2025-07-21 DIAGNOSIS — K21.9 GASTROESOPHAGEAL REFLUX DISEASE WITHOUT ESOPHAGITIS: ICD-10-CM

## 2025-07-21 DIAGNOSIS — N40.0 BENIGN PROSTATIC HYPERPLASIA WITHOUT LOWER URINARY TRACT SYMPTOMS: ICD-10-CM

## 2025-07-21 LAB
ALBUMIN SERPL-MCNC: 4.4 G/DL (ref 3.5–5.2)
ALP SERPL-CCNC: 69 U/L (ref 40–129)
ALT SERPL-CCNC: 22 U/L (ref 10–50)
ANION GAP SERPL CALCULATED.3IONS-SCNC: 11 MMOL/L (ref 8–16)
AST SERPL-CCNC: 25 U/L (ref 10–50)
BASOPHILS # BLD: 0.1 K/UL (ref 0–0.2)
BASOPHILS NFR BLD: 0.9 % (ref 0–1)
BILIRUB SERPL-MCNC: 0.5 MG/DL (ref 0.2–1.2)
BUN SERPL-MCNC: 20 MG/DL (ref 6–20)
CALCIUM SERPL-MCNC: 9.7 MG/DL (ref 8.6–10)
CHLORIDE SERPL-SCNC: 101 MMOL/L (ref 98–107)
CO2 SERPL-SCNC: 26 MMOL/L (ref 22–29)
CREAT SERPL-MCNC: 1.3 MG/DL (ref 0.7–1.2)
EOSINOPHIL # BLD: 0.1 K/UL (ref 0–0.6)
EOSINOPHIL NFR BLD: 2.2 % (ref 0–5)
ERYTHROCYTE [DISTWIDTH] IN BLOOD BY AUTOMATED COUNT: 13.3 % (ref 11.5–14.5)
GLUCOSE SERPL-MCNC: 103 MG/DL (ref 70–99)
HCT VFR BLD AUTO: 42.6 % (ref 42–52)
HGB BLD-MCNC: 14.5 G/DL (ref 14–18)
IMM GRANULOCYTES # BLD: 0 K/UL
LYMPHOCYTES # BLD: 1.9 K/UL (ref 1.1–4.5)
LYMPHOCYTES NFR BLD: 35.1 % (ref 20–40)
MCH RBC QN AUTO: 31.4 PG (ref 27–31)
MCHC RBC AUTO-ENTMCNC: 34 G/DL (ref 33–37)
MCV RBC AUTO: 92.2 FL (ref 80–94)
MONOCYTES # BLD: 0.7 K/UL (ref 0–0.9)
MONOCYTES NFR BLD: 12 % (ref 0–10)
NEUTROPHILS # BLD: 2.7 K/UL (ref 1.5–7.5)
NEUTS SEG NFR BLD: 49.4 % (ref 50–65)
PLATELET # BLD AUTO: 225 K/UL (ref 130–400)
PMV BLD AUTO: 9.5 FL (ref 9.4–12.4)
POTASSIUM SERPL-SCNC: 4 MMOL/L (ref 3.5–5.1)
PROT SERPL-MCNC: 6.4 G/DL (ref 6.4–8.3)
RBC # BLD AUTO: 4.62 M/UL (ref 4.7–6.1)
SODIUM SERPL-SCNC: 138 MMOL/L (ref 136–145)
TESTOST SERPL-MCNC: 697 NG/DL (ref 193–740)
WBC # BLD AUTO: 5.4 K/UL (ref 4.8–10.8)

## 2025-07-21 PROCEDURE — 99214 OFFICE O/P EST MOD 30 MIN: CPT | Performed by: FAMILY MEDICINE

## 2025-07-21 PROCEDURE — 3078F DIAST BP <80 MM HG: CPT | Performed by: FAMILY MEDICINE

## 2025-07-21 PROCEDURE — 3074F SYST BP LT 130 MM HG: CPT | Performed by: FAMILY MEDICINE

## 2025-07-21 RX ORDER — TAMSULOSIN HYDROCHLORIDE 0.4 MG/1
0.4 CAPSULE ORAL NIGHTLY
Qty: 90 CAPSULE | Refills: 3 | Status: SHIPPED | OUTPATIENT
Start: 2025-07-21 | End: 2027-07-19

## 2025-07-21 NOTE — PROGRESS NOTES
[facility]  [address]  [address]  [place] [address]  Dept: [phone]  Dept Fax: [phone]  Loc: [phone]    Pino Crooks is a 55 y.o. male who presents today for his medical conditions/complaints as noted below.  Pino Crooks is here for 6 Month Follow-Up        Subjective:   CC:  Here today to discuss the following:    Hypogonadism  Tolerating his current testosterone replacement therapy without adverse effects. He has noticed no symptoms of daytime somnolence consistent with sleep apnea. He has had no urinary symptoms. No mood disturbance. Overall satisfied with his current treatment    Hypertension  Compliant with medications.  No adverse effects from medication.  No lightheadedness, palpitations, or chest pain.      Depression with Anxiety  Compliant with medications.  No adverse effects from medication.  Depression and anxiety symptoms are stable today. No suicidal or homicidal ideation. Excessive worry, insomnia, and anxiousness are stable. Energy, concentration, and apathy are stable.        Review of Systems   Constitutional: Negative for chills and fever.   HENT: Negative for congestion.    Respiratory: Negative for cough, chest tightness and shortness of breath.  Cardiovascular: Negative for chest pain, palpitations and leg swelling.   Gastrointestinal: Negative for abdominal pain, anal bleeding, constipation, diarrhea and nausea.     Review of Systems  Objective:   /68   Pulse 97   Temp 98.8 °F (37.1 °C)   Wt 103 kg (227 lb)   SpO2 96%   BMI 34.52 kg/m²   BP Readings from Last 3 Encounters:   07/21/25 114/68   01/20/25 124/74   07/23/24 133/86    Wt Readings from Last 3 Encounters:   07/21/25 103 kg (227 lb)   01/20/25 100.2 kg (221 lb)   07/23/24 96.2 kg (212 lb)         Physical Exam   Constitutional: He appears well. Does not appear ill.   Neck: Neck supple. No masses.  Neck Symmetric.  Normal tracheal position.  No thyroid

## 2025-07-21 NOTE — PATIENT INSTRUCTIONS
Examine your lifestyle and the barriers to bad and good habits and how you can design your life to make better choices      Make it EASY to do the RIGHT THINGS.    1)  You can choose to Get 150 min/week of moderate exercise (can talk but can't sing) or 75 min/week of vigorous exercise (can't talk)   Examples: Walking, treadmill, bicycling, attending a gym.    2)  You can choose to Get 7-9 hours of sleep per night    Allow enough time each night to get adequate sleep.  Keep regular evening hours, same to bed and getting up every day.    3)  You can choose to Strength Train 2 x a week on non-consecutive days   Bodyweight exercises such push ups, sit ups, pilates or yoga   Purchase resistance bands to perform exercises   Utilize phone apps such as: [employer] or [employer]   Contact your local gym for a     4)  You can choose good nutrition.    Only eat your goal weight (in lbs) x 10 calories/day (Goal weight 150 lbs x 10 = 1500 calories per day)  Get 5 servings of Vegetables/day   Choose to eat a healthy diet such as the Mediterranean diet.    Plant based diets reduce risk of heart attack/stroke and will help you feel full on less food.    Avoid highly processed foods and processed carbohydrates.   Utilize apps to track your food:  Examples: Loseit, Myfitnesspal, or CalorieCounter by Maintenance Assistant   Choose to follow a program.   Examples: Weight Watchers, Adrienne Blackman, WholeKye or Nutrisystem   Utilize apps identify and improve eating habits:   Examples: Eat Right Now, Noom    5)  You can choose to drink less alcohol: Drink less than 1-2 drinks/day no more than 7 drinks per week.    Alcohol will disrupt your sleep and add calories to your day    6)  You can choose to Practice Mindfulness and work on stress reduction.    Utilize apps such as Calm, Headspace, Abide  Contact local behavioral health specialists      Small changes every day will add up

## (undated) DEVICE — SINGLE PORT MANIFOLD: Brand: NEPTUNE 2

## (undated) DEVICE — CANNULA NSL AD L7FT DIV O2 CO2 W/ M LUERLOCK TRMPT CONN

## (undated) DEVICE — ENDO KIT,LOURDES HOSPITAL: Brand: MEDLINE INDUSTRIES, INC.